# Patient Record
Sex: MALE | Race: WHITE | NOT HISPANIC OR LATINO | Employment: OTHER | URBAN - METROPOLITAN AREA
[De-identification: names, ages, dates, MRNs, and addresses within clinical notes are randomized per-mention and may not be internally consistent; named-entity substitution may affect disease eponyms.]

---

## 2017-11-21 ENCOUNTER — TRANSCRIBE ORDERS (OUTPATIENT)
Dept: ADMINISTRATIVE | Facility: HOSPITAL | Age: 82
End: 2017-11-21

## 2017-11-21 ENCOUNTER — HOSPITAL ENCOUNTER (OUTPATIENT)
Dept: ULTRASOUND IMAGING | Facility: HOSPITAL | Age: 82
Discharge: HOME | End: 2017-11-21
Attending: INTERNAL MEDICINE | Admitting: INTERNAL MEDICINE

## 2017-11-21 DIAGNOSIS — R10.9 FLANK PAIN: ICD-10-CM

## 2017-11-21 DIAGNOSIS — I12.9 CKD STAGE 4 SECONDARY TO HYPERTENSION (HCC): Primary | ICD-10-CM

## 2017-11-21 DIAGNOSIS — N18.4 CKD STAGE 4 SECONDARY TO HYPERTENSION (HCC): ICD-10-CM

## 2017-11-21 DIAGNOSIS — I12.9 CKD STAGE 4 SECONDARY TO HYPERTENSION (HCC): ICD-10-CM

## 2017-11-21 DIAGNOSIS — N18.4 CKD STAGE 4 SECONDARY TO HYPERTENSION (HCC): Primary | ICD-10-CM

## 2017-11-21 PROCEDURE — 76775 US EXAM ABDO BACK WALL LIM: CPT

## 2018-08-06 ENCOUNTER — OFFICE VISIT (OUTPATIENT)
Dept: ORTHOPEDIC SURGERY | Facility: CLINIC | Age: 83
End: 2018-08-06

## 2018-08-06 VITALS — HEIGHT: 66 IN | BODY MASS INDEX: 28.12 KG/M2 | TEMPERATURE: 97.8 F | WEIGHT: 175 LBS

## 2018-08-06 DIAGNOSIS — M25.512 CHRONIC PAIN OF BOTH SHOULDERS: Primary | ICD-10-CM

## 2018-08-06 DIAGNOSIS — G89.29 CHRONIC PAIN OF BOTH SHOULDERS: Primary | ICD-10-CM

## 2018-08-06 DIAGNOSIS — M19.019 ARTHRITIS OF SHOULDER: ICD-10-CM

## 2018-08-06 DIAGNOSIS — M25.511 CHRONIC PAIN OF BOTH SHOULDERS: Primary | ICD-10-CM

## 2018-08-06 PROBLEM — K21.9 GERD (GASTROESOPHAGEAL REFLUX DISEASE): Status: ACTIVE | Noted: 2018-08-06

## 2018-08-06 PROBLEM — K29.70 GASTRITIS: Status: ACTIVE | Noted: 2018-08-06

## 2018-08-06 PROBLEM — E78.5 HYPERLIPIDEMIA: Status: ACTIVE | Noted: 2018-08-06

## 2018-08-06 PROBLEM — M19.90 ARTHRITIS: Status: ACTIVE | Noted: 2018-08-06

## 2018-08-06 PROBLEM — G25.0 BENIGN ESSENTIAL TREMOR: Status: ACTIVE | Noted: 2018-08-06

## 2018-08-06 PROBLEM — I48.21 PERMANENT ATRIAL FIBRILLATION (HCC): Status: ACTIVE | Noted: 2018-08-06

## 2018-08-06 PROBLEM — I49.5 SICK SINUS SYNDROME (HCC): Status: ACTIVE | Noted: 2018-08-06

## 2018-08-06 PROBLEM — R60.9 EDEMA: Status: ACTIVE | Noted: 2018-08-06

## 2018-08-06 PROBLEM — I50.9 CONGESTIVE HEART FAILURE (HCC): Status: ACTIVE | Noted: 2018-08-06

## 2018-08-06 PROBLEM — G91.2 NPH (NORMAL PRESSURE HYDROCEPHALUS) (HCC): Status: ACTIVE | Noted: 2017-03-29

## 2018-08-06 PROBLEM — I35.0 AORTIC VALVE STENOSIS: Status: ACTIVE | Noted: 2018-08-06

## 2018-08-06 PROBLEM — G91.9 HYDROCEPHALUS (HCC): Status: ACTIVE | Noted: 2018-08-06

## 2018-08-06 PROBLEM — I48.0 PAF (PAROXYSMAL ATRIAL FIBRILLATION) (HCC): Status: ACTIVE | Noted: 2018-08-06

## 2018-08-06 PROBLEM — I77.810 AORTIC ROOT DILATATION (HCC): Status: ACTIVE | Noted: 2018-08-06

## 2018-08-06 PROBLEM — I48.92 PAROXYSMAL ATRIAL FLUTTER (HCC): Status: ACTIVE | Noted: 2018-08-06

## 2018-08-06 PROBLEM — I10 HYPERTENSION: Status: ACTIVE | Noted: 2018-08-06

## 2018-08-06 PROBLEM — I25.10 CAD (CORONARY ARTERY DISEASE): Status: ACTIVE | Noted: 2018-08-06

## 2018-08-06 PROBLEM — N18.9 CHRONIC KIDNEY DISEASE: Status: ACTIVE | Noted: 2018-08-06

## 2018-08-06 PROCEDURE — 20610 DRAIN/INJ JOINT/BURSA W/O US: CPT | Performed by: ORTHOPAEDIC SURGERY

## 2018-08-06 PROCEDURE — 99204 OFFICE O/P NEW MOD 45 MIN: CPT | Performed by: ORTHOPAEDIC SURGERY

## 2018-08-06 PROCEDURE — 73030 X-RAY EXAM OF SHOULDER: CPT | Performed by: ORTHOPAEDIC SURGERY

## 2018-08-06 RX ORDER — AMIODARONE HYDROCHLORIDE 200 MG/1
TABLET ORAL
COMMUNITY
Start: 2016-11-29 | End: 2019-06-05 | Stop reason: ALTCHOICE

## 2018-08-06 RX ORDER — FERROUS SULFATE 325(65) MG
325 TABLET ORAL WEEKLY
COMMUNITY

## 2018-08-06 RX ORDER — WARFARIN SODIUM 5 MG/1
TABLET ORAL
COMMUNITY
Start: 2016-09-12 | End: 2018-08-06 | Stop reason: ALTCHOICE

## 2018-08-06 RX ORDER — FUROSEMIDE 80 MG
40 TABLET ORAL EVERY MORNING
COMMUNITY
End: 2019-06-05 | Stop reason: ALTCHOICE

## 2018-08-06 RX ORDER — DOCUSATE SODIUM 100 MG/1
100 CAPSULE, LIQUID FILLED ORAL DAILY
COMMUNITY
End: 2019-06-05 | Stop reason: ALTCHOICE

## 2018-08-06 RX ORDER — CALCITRIOL 0.25 UG/1
CAPSULE, LIQUID FILLED ORAL
COMMUNITY
End: 2019-10-08

## 2018-08-06 RX ORDER — CHOLECALCIFEROL (VITAMIN D3) 50 MCG
2000 TABLET ORAL
COMMUNITY
End: 2019-06-04

## 2018-08-06 RX ORDER — HEPATITIS A VACCINE 1440 [IU]/ML
INJECTION, SUSPENSION INTRAMUSCULAR SEE ADMIN INSTRUCTIONS
Refills: 1 | COMMUNITY
Start: 2018-07-06 | End: 2019-10-08

## 2018-08-06 RX ORDER — ATORVASTATIN CALCIUM 20 MG/1
TABLET, FILM COATED ORAL
COMMUNITY
End: 2019-10-08

## 2018-08-06 RX ADMIN — METHYLPREDNISOLONE ACETATE 80 MG: 80 INJECTION, SUSPENSION INTRA-ARTICULAR; INTRALESIONAL; INTRAMUSCULAR; SOFT TISSUE at 13:12

## 2018-08-06 RX ADMIN — LIDOCAINE HYDROCHLORIDE 2 ML: 10 INJECTION, SOLUTION EPIDURAL; INFILTRATION; INTRACAUDAL; PERINEURAL at 13:12

## 2018-08-06 NOTE — PROGRESS NOTES
Patient: Robert Cheek    YOB: 1921    Medical Record Number: [id number]    Chief Complaints:  Bilateral shoulder pain    History of Present Illness:     89 y.o. male patient who presents with a long history of progressively worsening pain and dysfunction affecting both shoulders.  His symptoms have gotten progressively worse over the past month.  He cannot recall any specific inciting event or factor.  He reports moderate to severe constant stabbing pain.  Symptoms are worse when he raises his arms to or above shoulder level.  He has not noticed any mechanical symptoms or swelling.  Tylenol has helped somewhat.  Both arms bother him.  The right is somewhat worse.  He is right-hand-dominant.    Allergies:   Allergies   Allergen Reactions   • Adhesive Tape Unknown (See Comments)     Redness, raw   • Dabigatran Etexilate Mesylate Unknown (See Comments) and Nausea And Vomiting   • Ibuprofen Unknown (See Comments)   • Indomethacin Nausea And Vomiting and Unknown (See Comments)   • Simvastatin Nausea And Vomiting and Unknown (See Comments)       Home Medications:    Current Outpatient Prescriptions:   •  amiodarone (PACERONE) 200 MG tablet, , Disp: , Rfl:   •  atorvastatin (LIPITOR) 20 MG tablet, atorvastatin 20 mg tablet  Take 1 tablet every day by oral route at bedtime for 90 days., Disp: , Rfl:   •  calcitriol (ROCALTROL) 0.25 MCG capsule, calcitriol 0.25 mcg capsule  Take 1 capsule every day by oral route in the evening for 90 days., Disp: , Rfl:   •  Cholecalciferol (VITAMIN D) 2000 units tablet, Take 2,000 Units by mouth., Disp: , Rfl:   •  docusate sodium (COLACE) 100 MG capsule, Take 100 mg by mouth Daily. Take 1 capsule by mouth every day, Disp: , Rfl:   •  ferrous sulfate 325 (65 FE) MG tablet, Take 325 mg by mouth 1 (One) Time Per Week. Take 1 tablet by mouth every Friday evening, Disp: , Rfl:   •  furosemide (LASIX) 80 MG tablet, Take 40 mg by mouth Every Morning., Disp: , Rfl:   •  metoprolol  tartrate (LOPRESSOR) 25 MG tablet, Take 25 mg by mouth 2 (Two) Times a Day. Take one half tablet by mouth twice daily, Disp: , Rfl:   •  Probiotic Product (PROBIOTIC-10 PO), Probiotic  Use as directed., Disp: , Rfl:   •  Cholecalciferol (VITAMIN D3) 1000 units capsule, Vitamin D3 1,000 unit capsule  Take 2000units as directed., Disp: , Rfl:   •  HAVRIX 1440 EL U/ML vaccine, See Admin Instructions., Disp: , Rfl: 1  •  Misc. Devices (COMMODE BEDSIDE) misc, 1 each., Disp: , Rfl:     History reviewed. No pertinent past medical history.    Past Surgical History:   Procedure Laterality Date   • CSF SHUNT  05/2017   • KNEE SURGERY Left 2012   • PACEMAKER REPLACEMENT  2006       Social History     Occupational History   • Not on file.     Social History Main Topics   • Smoking status: Never Smoker   • Smokeless tobacco: Not on file   • Alcohol use Not on file   • Drug use: Unknown   • Sexual activity: Not on file      Social History     Social History Narrative   • No narrative on file       History reviewed. No pertinent family history.    Review of Systems:      Constitutional: Denies fever, shaking or chills   Eyes: Denies change in visual acuity   HEENT: Denies nasal congestion or sore throat   Respiratory: Denies cough or shortness of breath   Cardiovascular: Denies chest pain or edema  Endocrine: Denies tremors, palpitations, intolerance of heat or cold, polyuria, polydipsia.  GI: Denies abdominal pain, nausea, vomiting, bloody stools or diarrhea  : Denies frequency, urgency, incontinence, retention, or nocturia.  Musculoskeletal: Denies numbness, tingling or loss of motor function   Integument: Denies rash, lesion or ulceration   Neurologic: Denies headache or focal weakness, deficits  Heme: Denies spontaneous or excessive bleeding, epistaxis, hematuria, melena, fatigue, enlarged or tender lymph nodes.      All other pertinent positives and negatives as noted above in HPI.    Physical Exam: 89 y.o. male  Vitals:     "08/06/18 1246   Temp: 97.8 °F (36.6 °C)   TempSrc: Temporal Artery    Weight: 79.4 kg (175 lb)   Height: 167.6 cm (66\")     General:  Patient is awake and alert.  Appears in no acute distress or discomfort.    Psych:  Affect and demeanor are appropriate.    Eyes:  Conjunctiva and sclera appear grossly normal.  Eyes track well and EOM seem to be intact.    Ears:  No gross abnormalities.  Hearing adequate for the exam.    Cardiovascular:  Regular rate and rhythm.    Lungs:  Good chest expansion.  Breathing unlabored.    Spine:  Neck appears grossly normal.  No palpable masses or adenopathy.  Good motion.  Spurling's maneuver is negative for any shoulder or arm symptoms.    Extremities:  Skin is benign.  No obvious gross abnormalities about right shoulder.  No palpable masses or adenopathy.  Moderate tenderness noted over anterior glenohumeral joint and rotator interval.  Motion is to 105° of forward elevation, 25° external rotation, lacks 10° of horizontal abduction, back pocket internal rotation.  There is crepitus with range of motion.  No instability.  Rotator cuff strength seems to be well preserved but the exam is limited due to patient discomfort with resisted active motion.  Good motor and sensory function in lower arm and hand.  Palpable pulses.    Left shoulder exam is similar.  Motion is about the same to that on his right.  He does have crepitus with range of motion.  Seems to have good strength in his rotator cuff and deltoid but resisted testing is uncomfortable.         Radiology:  Bilateral AP and scapular Y views of the shoulders are ordered by myself and reviewed to evaluate the patient's complaint.  No comparison films are immediately available.  The x-rays show moderate to severe glenohumeral osteoarthritis with joint space narrowing, osteophyte formation, and subchondral sclerosis.  The acromiohumeral interval measures normal.    Assessment/Plan:  Bilateral shoulder osteoarthritis    We discussed " treatment options in detail including conservative treatment versus surgical options.  Regarding conservative treatment, we discussed appropriate activity modifications, anti-inflammatories, injections, and physical therapy.  We also discussed the option of an arthroplasty and all that would entail.  I have recommended that we start with a conservative approach and the patient agrees.    The patient has acknowledged understanding of the information and elected for injections of both shoulders.  The risks, benefits and alternatives were discussed.  He consented.  Going forward, he will follow-up as needed.    Montez Holden MD    08/06/2018    CC to Pallares, Clara Ann, MD    Large Joint Arthrocentesis  Date/Time: 8/6/2018 1:12 PM  Consent given by: patient  Site marked: site marked  Timeout: Immediately prior to procedure a time out was called to verify the correct patient, procedure, equipment, support staff and site/side marked as required   Supporting Documentation  Indications: pain   Procedure Details  Location: shoulder - R glenohumeral  Preparation: Patient was prepped and draped in the usual sterile fashion  Needle gauge: 21 G.  Approach: posterior  Medications administered: 80 mg methylPREDNISolone acetate 80 MG/ML; 2 mL lidocaine PF 1% 1 %  Patient tolerance: patient tolerated the procedure well with no immediate complications    Large Joint Arthrocentesis  Date/Time: 8/6/2018 1:12 PM  Consent given by: patient  Site marked: site marked  Timeout: Immediately prior to procedure a time out was called to verify the correct patient, procedure, equipment, support staff and site/side marked as required   Supporting Documentation  Indications: pain   Procedure Details  Location: shoulder - L glenohumeral  Preparation: Patient was prepped and draped in the usual sterile fashion  Needle gauge: 21 g.  Approach: posterior  Medications administered: 80 mg methylPREDNISolone acetate 80 MG/ML; 2 mL lidocaine PF 1% 1  %  Patient tolerance: patient tolerated the procedure well with no immediate complications

## 2018-08-07 RX ORDER — LIDOCAINE HYDROCHLORIDE 10 MG/ML
2 INJECTION, SOLUTION EPIDURAL; INFILTRATION; INTRACAUDAL; PERINEURAL
Status: COMPLETED | OUTPATIENT
Start: 2018-08-06 | End: 2018-08-06

## 2018-08-07 RX ORDER — METHYLPREDNISOLONE ACETATE 80 MG/ML
80 INJECTION, SUSPENSION INTRA-ARTICULAR; INTRALESIONAL; INTRAMUSCULAR; SOFT TISSUE
Status: COMPLETED | OUTPATIENT
Start: 2018-08-06 | End: 2018-08-06

## 2018-12-11 ENCOUNTER — TRANSCRIBE ORDERS (OUTPATIENT)
Dept: ADMINISTRATIVE | Facility: HOSPITAL | Age: 83
End: 2018-12-11

## 2018-12-11 DIAGNOSIS — M21.371 RIGHT FOOT DROP: Primary | ICD-10-CM

## 2019-01-01 ENCOUNTER — HOSPITAL ENCOUNTER (OUTPATIENT)
Dept: CARDIOLOGY | Facility: HOSPITAL | Age: 84
Discharge: HOME | End: 2019-12-20
Admitting: INTERNAL MEDICINE

## 2019-01-01 VITALS
WEIGHT: 181 LBS | SYSTOLIC BLOOD PRESSURE: 122 MMHG | BODY MASS INDEX: 25.91 KG/M2 | HEIGHT: 70 IN | DIASTOLIC BLOOD PRESSURE: 64 MMHG

## 2019-01-01 DIAGNOSIS — I35.0 NONRHEUMATIC AORTIC VALVE STENOSIS: ICD-10-CM

## 2019-01-01 LAB
AORTIC DIMENSIONLESS INDEX: 0.4 (DI)
BH CV ECHO MEAS - ACS: 1 CM
BH CV ECHO MEAS - AI DEC SLOPE: 263 CM/SEC^2
BH CV ECHO MEAS - AI MAX PG: 79.2 MMHG
BH CV ECHO MEAS - AI MAX VEL: 445 CM/SEC
BH CV ECHO MEAS - AI P1/2T: 495.6 MSEC
BH CV ECHO MEAS - AO MAX PG: 27 MMHG
BH CV ECHO MEAS - AO MEAN PG (FULL): 13 MMHG
BH CV ECHO MEAS - AO MEAN PG: 17 MMHG
BH CV ECHO MEAS - AO ROOT AREA (BSA CORRECTED): 1.8
BH CV ECHO MEAS - AO ROOT AREA: 10.2 CM^2
BH CV ECHO MEAS - AO ROOT DIAM: 3.6 CM
BH CV ECHO MEAS - AO V2 MAX: 259 CM/SEC
BH CV ECHO MEAS - AO V2 MEAN: 197 CM/SEC
BH CV ECHO MEAS - AO V2 VTI: 76.7 CM
BH CV ECHO MEAS - ASC AORTA: 3.6 CM
BH CV ECHO MEAS - AVA PLANIMETRY TRACED: 2 CM2
BH CV ECHO MEAS - AVA(I,A): 1.4 CM^2
BH CV ECHO MEAS - AVA(I,D): 1.4 CM^2
BH CV ECHO MEAS - BSA(HAYCOCK): 2 M^2
BH CV ECHO MEAS - BSA: 2 M^2
BH CV ECHO MEAS - BZI_BMI: 26 KILOGRAMS/M^2
BH CV ECHO MEAS - BZI_METRIC_HEIGHT: 177.8 CM
BH CV ECHO MEAS - BZI_METRIC_WEIGHT: 82.1 KG
BH CV ECHO MEAS - EDV(CUBED): 42.9 ML
BH CV ECHO MEAS - EDV(MOD-SP2): 54 ML
BH CV ECHO MEAS - EDV(MOD-SP4): 58 ML
BH CV ECHO MEAS - EDV(TEICH): 50.9 ML
BH CV ECHO MEAS - EF(CUBED): 67.8 %
BH CV ECHO MEAS - EF(MOD-BP): 64 %
BH CV ECHO MEAS - EF(MOD-SP2): 64.8 %
BH CV ECHO MEAS - EF(MOD-SP4): 63.8 %
BH CV ECHO MEAS - EF(TEICH): 60.4 %
BH CV ECHO MEAS - ESV(CUBED): 13.8 ML
BH CV ECHO MEAS - ESV(MOD-SP2): 19 ML
BH CV ECHO MEAS - ESV(MOD-SP4): 21 ML
BH CV ECHO MEAS - ESV(TEICH): 20.2 ML
BH CV ECHO MEAS - FS: 31.4 %
BH CV ECHO MEAS - IVS/LVPW: 1.3
BH CV ECHO MEAS - IVSD: 1.7 CM
BH CV ECHO MEAS - LAT PEAK E' VEL: 6 CM/SEC
BH CV ECHO MEAS - LV DIASTOLIC VOL/BSA (35-75): 29 ML/M^2
BH CV ECHO MEAS - LV MASS(C)D: 193.4 GRAMS
BH CV ECHO MEAS - LV MASS(C)DI: 96.7 GRAMS/M^2
BH CV ECHO MEAS - LV MAX PG: 6 MMHG
BH CV ECHO MEAS - LV MEAN PG: 4 MMHG
BH CV ECHO MEAS - LV SYSTOLIC VOL/BSA (12-30): 10.5 ML/M^2
BH CV ECHO MEAS - LV V1 MAX: 120 CM/SEC
BH CV ECHO MEAS - LV V1 MEAN: 93.1 CM/SEC
BH CV ECHO MEAS - LV V1 VTI: 31 CM
BH CV ECHO MEAS - LVIDD: 3.5 CM
BH CV ECHO MEAS - LVIDS: 2.4 CM
BH CV ECHO MEAS - LVLD AP2: 6.1 CM
BH CV ECHO MEAS - LVLD AP4: 6 CM
BH CV ECHO MEAS - LVLS AP2: 5.1 CM
BH CV ECHO MEAS - LVLS AP4: 5 CM
BH CV ECHO MEAS - LVOT AREA (M): 3.5 CM^2
BH CV ECHO MEAS - LVOT AREA: 3.5 CM^2
BH CV ECHO MEAS - LVOT DIAM: 2.1 CM
BH CV ECHO MEAS - LVPWD: 1.3 CM
BH CV ECHO MEAS - MED PEAK E' VEL: 5 CM/SEC
BH CV ECHO MEAS - MV A DUR: 0.14 SEC
BH CV ECHO MEAS - MV A MAX VEL: 68.1 CM/SEC
BH CV ECHO MEAS - MV DEC SLOPE: 577 CM/SEC^2
BH CV ECHO MEAS - MV DEC TIME: 200 SEC
BH CV ECHO MEAS - MV E MAX VEL: 103 CM/SEC
BH CV ECHO MEAS - MV E/A: 1.5
BH CV ECHO MEAS - MV MEAN PG: 3 MMHG
BH CV ECHO MEAS - MV P1/2T MAX VEL: 130 CM/SEC
BH CV ECHO MEAS - MV P1/2T: 66 MSEC
BH CV ECHO MEAS - MV V2 MEAN: 83 CM/SEC
BH CV ECHO MEAS - MV V2 VTI: 32.6 CM
BH CV ECHO MEAS - MVA P1/2T LCG: 1.7 CM^2
BH CV ECHO MEAS - MVA(P1/2T): 3.3 CM^2
BH CV ECHO MEAS - MVA(VTI): 3.3 CM^2
BH CV ECHO MEAS - PA ACC SLOPE: 1033 CM/SEC^2
BH CV ECHO MEAS - PA ACC TIME: 0.09 SEC
BH CV ECHO MEAS - PA MAX PG: 3.1 MMHG
BH CV ECHO MEAS - PA PR(ACCEL): 39.4 MMHG
BH CV ECHO MEAS - PA V2 MAX: 88.2 CM/SEC
BH CV ECHO MEAS - QP/QS: 0.78
BH CV ECHO MEAS - RAP SYSTOLE: 8 MMHG
BH CV ECHO MEAS - RV MEAN PG: 1 MMHG
BH CV ECHO MEAS - RV V1 MEAN: 48.7 CM/SEC
BH CV ECHO MEAS - RV V1 VTI: 12.7 CM
BH CV ECHO MEAS - RVOT AREA: 6.6 CM^2
BH CV ECHO MEAS - RVOT DIAM: 2.9 CM
BH CV ECHO MEAS - RVSP: 44 MMHG
BH CV ECHO MEAS - SI(AO): 390.2 ML/M^2
BH CV ECHO MEAS - SI(CUBED): 14.5 ML/M^2
BH CV ECHO MEAS - SI(LVOT): 53.7 ML/M^2
BH CV ECHO MEAS - SI(MOD-SP2): 17.5 ML/M^2
BH CV ECHO MEAS - SI(MOD-SP4): 18.5 ML/M^2
BH CV ECHO MEAS - SI(TEICH): 15.3 ML/M^2
BH CV ECHO MEAS - SV(AO): 780.7 ML
BH CV ECHO MEAS - SV(CUBED): 29.1 ML
BH CV ECHO MEAS - SV(LVOT): 107.4 ML
BH CV ECHO MEAS - SV(MOD-SP2): 35 ML
BH CV ECHO MEAS - SV(MOD-SP4): 37 ML
BH CV ECHO MEAS - SV(RVOT): 83.9 ML
BH CV ECHO MEAS - SV(TEICH): 30.7 ML
BH CV ECHO MEAS - TAPSE (>1.6): 3 CM2
BH CV ECHO MEAS - TR MAX VEL: 301 CM/SEC
BH CV ECHO MEASUREMENTS AVERAGE E/E' RATIO: 18.73
BH CV VAS BP LEFT ARM: NORMAL MMHG
BH CV XLRA - RV BASE: 3.9 CM
BH CV XLRA - TDI S': 15 CM/SEC
LEFT ATRIUM VOLUME INDEX: 52 ML/M2

## 2019-01-01 PROCEDURE — 93306 TTE W/DOPPLER COMPLETE: CPT | Performed by: INTERNAL MEDICINE

## 2019-01-01 PROCEDURE — 93306 TTE W/DOPPLER COMPLETE: CPT

## 2019-01-08 ENCOUNTER — HOSPITAL ENCOUNTER (OUTPATIENT)
Dept: INFUSION THERAPY | Facility: HOSPITAL | Age: 84
Discharge: HOME | End: 2019-01-08
Attending: PSYCHIATRY & NEUROLOGY | Admitting: PSYCHIATRY & NEUROLOGY

## 2019-01-08 DIAGNOSIS — M21.371 RIGHT FOOT DROP: ICD-10-CM

## 2019-01-08 PROCEDURE — 95886 MUSC TEST DONE W/N TEST COMP: CPT | Performed by: PSYCHIATRY & NEUROLOGY

## 2019-01-08 PROCEDURE — 95886 MUSC TEST DONE W/N TEST COMP: CPT

## 2019-01-08 PROCEDURE — 95907 NVR CNDJ TST 1-2 STUDIES: CPT

## 2019-01-08 PROCEDURE — 95907 NVR CNDJ TST 1-2 STUDIES: CPT | Performed by: PSYCHIATRY & NEUROLOGY

## 2019-01-08 NOTE — PROCEDURES
EMG REPORT    Indication: Right foot drop    Findings: Right peroneal motor study showed normal distal latency small amplitude response and very slow velocity of 22 m/s.  Right tibial motor study showed normal distal latency velocity and amplitude.    .Concentric needle EMG of the right vastus lateralis, vastus medialis,  peroneus, gastrocnemius muscles showed no abnormality.  In the right anterior tibialis muscle there was activity restiform fibrillation potentials and positive sharp waves.  Motor unit potentials were decreased but present.    Impression: Studies show evidence of an incomplete, but severe, right peroneal neuropathy which most likely localizes at the popliteal fossa region.  Needle EMG failed to show evidence of superimposed lumbosacral radiculopathy.    Thank you for sending this patient for further evaluation.  Teto Simmons M.D.

## 2019-06-05 ENCOUNTER — OFFICE VISIT (OUTPATIENT)
Dept: CARDIOLOGY | Facility: CLINIC | Age: 84
End: 2019-06-05

## 2019-06-05 VITALS
SYSTOLIC BLOOD PRESSURE: 112 MMHG | HEIGHT: 70 IN | HEART RATE: 76 BPM | WEIGHT: 176 LBS | BODY MASS INDEX: 25.2 KG/M2 | DIASTOLIC BLOOD PRESSURE: 72 MMHG

## 2019-06-05 DIAGNOSIS — I48.21 PERMANENT ATRIAL FIBRILLATION (HCC): ICD-10-CM

## 2019-06-05 DIAGNOSIS — Z95.0 PRESENCE OF CARDIAC PACEMAKER: ICD-10-CM

## 2019-06-05 DIAGNOSIS — G91.2 NPH (NORMAL PRESSURE HYDROCEPHALUS) (HCC): ICD-10-CM

## 2019-06-05 DIAGNOSIS — R60.0 LOCALIZED EDEMA: Primary | ICD-10-CM

## 2019-06-05 DIAGNOSIS — I10 ESSENTIAL HYPERTENSION: ICD-10-CM

## 2019-06-05 DIAGNOSIS — I95.1 AUTONOMIC ORTHOSTATIC HYPOTENSION: ICD-10-CM

## 2019-06-05 DIAGNOSIS — I35.0 NONRHEUMATIC AORTIC VALVE STENOSIS: ICD-10-CM

## 2019-06-05 PROCEDURE — 99215 OFFICE O/P EST HI 40 MIN: CPT | Performed by: INTERNAL MEDICINE

## 2019-06-05 RX ORDER — MIDODRINE HYDROCHLORIDE 2.5 MG/1
2.5 TABLET ORAL DAILY
Refills: 4 | COMMUNITY
Start: 2019-05-29

## 2019-06-05 RX ORDER — FUROSEMIDE 40 MG/1
40 TABLET ORAL 2 TIMES DAILY
Status: ON HOLD | COMMUNITY
End: 2019-10-17

## 2019-06-05 NOTE — PROGRESS NOTES
[facility]        Subjective:     Encounter Date:06/05/2019      Patient ID: Robert Cheek is a 89 y.o. male.    Chief Complaint:  Chief Complaint   Patient presents with   • Atrial Fibrillation   • Congestive Heart Failure       HPI:  I had the pleasure of seeing Mr. Cheek in the office today.  He is a pleasant 89-year-old gentleman who has a history of atrial fibrillation.  His atrial fibrillation is now permanent.  He also has diastolic heart failure, hyperlipidemia, moderate aortic valve stenosis, chronic kidney disease, sick sinus syndrome and a permanent pacemaker.    Robert is in the office today due to a new problem of edema in his left upper extremity.  He states this began approximately 2 weeks ago.  His diuretic was increased to 80 mg daily.  He has not noticed significant improvement.  His daughter accompanies him and states that he has weeping fluid from his left arm periodically.  He does go to physical therapy for mobility in his upper extremities.  He has had no recent trauma.  There is mild discomfort in the left  upper extremity.  He also has chronic lower extremity edema and wears compression stockings.  He is alert and oriented.  He does not complain of shortness of air but he is fairly sedentary in his wheelchair.  He has orthostatic hypotension as well as normal pressure hydrocephalus.  He still has some episodes where he is unsteady on his feet.  He is not complaining of chest discomfort.  He has had no fever or chills.    The following portions of the patient's history were reviewed and updated as appropriate: allergies, current medications, past family history, past medical history, past social history, past surgical history and problem list.    Problem List:  Patient Active Problem List   Diagnosis   • Acute pain of right shoulder   • Aortic root dilatation (CMS/HCC)   • Aortic valve stenosis   • Arthritis   • Benign essential tremor   • CAD (coronary artery disease)   •  Chronic kidney disease   • Congestive heart failure (CMS/[facility])   • Gastritis   • GERD (gastroesophageal reflux disease)   • Tachycardia   • Sick sinus syndrome (CMS/[facility])   • Permanent atrial fibrillation (CMS/[facility])   • Paroxysmal atrial flutter (CMS/[facility])   • PAF (paroxysmal atrial fibrillation) (CMS/[facility])   • NPH (normal pressure hydrocephalus)   • Hypertension   • Hyperlipidemia   • Hypercholesteremia   • Hydrocephalus   • Edema   • Presence of cardiac pacemaker   • Autonomic orthostatic hypotension       Past Medical History:  Past Medical History:   Diagnosis Date   • Aortic root dilatation (CMS/[facility])    • Aortic valve stenosis    • Atrial fibrillation (CMS/[facility])    • CHF (congestive heart failure) (CMS/[facility])    • Chronic kidney disease    • GERD (gastroesophageal reflux disease)    • Hydrocephalus    • Hyperlipidemia    • Hypertension    • SSS (sick sinus syndrome) (CMS/[facility])        Past Surgical History:  Past Surgical History:   Procedure Laterality Date   • CSF SHUNT  05/2017   • HERNIA REPAIR     • INSERT / REPLACE / REMOVE PACEMAKER     • KNEE SURGERY Left 2012   • PACEMAKER REPLACEMENT  2006       Social History:  Social History     Socioeconomic History   • Marital status:      Spouse name: Not on file   • Number of children: Not on file   • Years of education: Not on file   • Highest education level: Not on file   Tobacco Use   • Smoking status: Never Smoker       Allergies:  Allergies   Allergen Reactions   • Adhesive Tape Unknown (See Comments)     Redness, raw   • Dabigatran Etexilate Mesylate Unknown (See Comments) and Nausea And Vomiting   • Ibuprofen Unknown (See Comments)   • Indomethacin Nausea And Vomiting and Unknown (See Comments)   • Simvastatin Nausea And Vomiting and Unknown (See Comments)         ROS:  Review of Systems   Constitution: Positive for malaise/fatigue. Negative for chills, decreased appetite, fever, weight gain and weight loss.   HENT: Negative for congestion, hoarse voice,  "nosebleeds and sore throat.    Eyes: Negative for blurred vision, double vision and visual disturbance.   Cardiovascular: Positive for leg swelling. Negative for chest pain, claudication, dyspnea on exertion, irregular heartbeat, near-syncope, orthopnea, palpitations, paroxysmal nocturnal dyspnea and syncope.   Respiratory: Negative for cough, hemoptysis, shortness of breath, sleep disturbances due to breathing, snoring, sputum production and wheezing.    Endocrine: Negative for cold intolerance, heat intolerance, polydipsia and polyuria.   Hematologic/Lymphatic: Negative for adenopathy and bleeding problem. Bruises/bleeds easily.   Skin: Negative for flushing, itching, nail changes and rash.   Musculoskeletal: Positive for arthritis and muscle weakness. Negative for back pain, joint pain, muscle cramps, myalgias and neck pain.   Gastrointestinal: Negative for bloating, abdominal pain, anorexia, change in bowel habit, constipation, diarrhea, heartburn, hematemesis, hematochezia, jaundice, melena, nausea and vomiting.   Genitourinary: Negative for dysuria, hematuria and nocturia.   Neurological: Positive for disturbances in coordination and light-headedness. Negative for brief paralysis, excessive daytime sleepiness, dizziness, headaches, loss of balance, numbness, paresthesias, seizures and vertigo.        Normal pressure hydrocephalus   Psychiatric/Behavioral: Negative for altered mental status and depression. The patient is not nervous/anxious.    Allergic/Immunologic: Negative for environmental allergies and hives.          Objective:         /72   Pulse 76   Ht 177.8 cm (70\")   Wt 79.8 kg (176 lb)   BMI 25.25 kg/m²     Physical Exam   Constitutional: He is oriented to person, place, and time. No distress.   Slightly disheveled appearance   HENT:   Head:       Mouth/Throat: Oropharynx is clear and moist.   There is a shunt in place in the right parietotemporal area   Eyes: Conjunctivae and EOM are " normal. Pupils are equal, round, and reactive to light. No scleral icterus.   Neck: Normal range of motion. Neck supple. No thyromegaly present.   Cardiovascular: Normal rate, S1 normal, S2 normal and intact distal pulses.  No extrasystoles are present. PMI is not displaced. Exam reveals no gallop, no S3, no S4, no friction rub and no decreased pulses.   Murmur ( There is a 2/6 systolic murmur heard at the upper right sternal border) heard.  Pulses:       Carotid pulses are 1+ on the right side, and 1+ on the left side.       Radial pulses are 2+ on the right side, and 2+ on the left side.        Dorsalis pedis pulses are 1+ on the right side, and 1+ on the left side.        Posterior tibial pulses are 1+ on the right side, and 1+ on the left side.   Pulmonary/Chest: Effort normal and breath sounds normal. No respiratory distress. He has no wheezes. He has no rales.   Healed pacemaker scar   Abdominal: Soft. Bowel sounds are normal. He exhibits no distension and no mass. There is no tenderness. There is no rebound and no guarding.   Mild abdominal fullness   Musculoskeletal: Normal range of motion. He exhibits edema ( There is 2-3+ pitting edema of the lower extremities bilaterally).   Lymphadenopathy:     He has no cervical adenopathy.   Neurological: He is alert and oriented to person, place, and time. Coordination normal.   Skin: Skin is warm. Ecchymosis (Bilateral upper extremity ecchymosis. ) noted. No rash noted. He is not diaphoretic. No pallor.   There is marked ecchymosis of both upper extremities.  The left upper extremity and hand are edematous and there is mild weeping edema.   Psychiatric: He has a normal mood and affect. His behavior is normal.   Nursing note and vitals reviewed.      In-Office Procedure(s):  Procedures    ASCVD RIsk Score::  The ASCVD Risk score (Boris ADITHYA Jr., et al., 2013) failed to calculate for the following reasons:    The 2013 ASCVD risk score is only valid for ages 40 to  79    Recent Radiology:  Imaging Results (most recent)     None          Lab Review:              Invalid input(s): ALKPO4                        Invalid input(s): LDLCALC                Assessment:         Problems Addressed this Visit        Cardiovascular and Mediastinum    Aortic valve stenosis    Relevant Medications    midodrine (PROAMATINE) 2.5 MG tablet    Permanent atrial fibrillation (CMS/HCC)    Relevant Medications    midodrine (PROAMATINE) 2.5 MG tablet    Hypertension    Relevant Medications    midodrine (PROAMATINE) 2.5 MG tablet    furosemide (LASIX) 40 MG tablet    Presence of cardiac pacemaker    Autonomic orthostatic hypotension       Nervous and Auditory    NPH (normal pressure hydrocephalus)       Other    Edema - Primary    Relevant Orders    Duplex Venous Upper Extremity - Left CAR             Plan:     I spoke with Dr. Haney, Mr. Cheek's nephrologist.  We both agree that his diuretic should not be increased at this time.  We also agreed that he should wrap his arm in an Ace bandage continue with mobility.  I have ordered an ultrasound of his upper extremity to make sure there is no evidence of a DVT.  Mr. Cheek has had a general decline in his health over the past year.  He has become more dependent and less mobile.  He does have aortic valve stenosis which upon his echo in June 2018 revealed moderate aortic stenosis.  He also has moderate aortic insufficiency, mild mitral insufficiency and severe tricuspid insufficiency.   We will continue to be managed conservatively as has been requested by his daughter in the past.  I spent approximately 30 to 45 minutes with the patient and his daughter, reviewing his records and discussion with Dr. Haney.             Marquita Luna MD  06/05/19  .

## 2019-06-06 ENCOUNTER — HOSPITAL ENCOUNTER (OUTPATIENT)
Dept: CARDIOLOGY | Facility: HOSPITAL | Age: 84
Discharge: HOME | End: 2019-06-06
Admitting: INTERNAL MEDICINE

## 2019-06-06 DIAGNOSIS — R60.0 LOCALIZED EDEMA: ICD-10-CM

## 2019-06-06 LAB
BH CV UPPER VENOUS LEFT AXILLARY AUGMENT: NORMAL
BH CV UPPER VENOUS LEFT AXILLARY COMPETENT: NORMAL
BH CV UPPER VENOUS LEFT AXILLARY COMPRESS: NORMAL
BH CV UPPER VENOUS LEFT AXILLARY PHASIC: NORMAL
BH CV UPPER VENOUS LEFT AXILLARY SPONT: NORMAL
BH CV UPPER VENOUS LEFT BASILIC FOREARM COMPRESS: NORMAL
BH CV UPPER VENOUS LEFT BASILIC UPPER COMPRESS: NORMAL
BH CV UPPER VENOUS LEFT BRACHIAL COMPRESS: NORMAL
BH CV UPPER VENOUS LEFT CEPHALIC FOREARM COMPRESS: NORMAL
BH CV UPPER VENOUS LEFT CEPHALIC UPPER COMPRESS: NORMAL
BH CV UPPER VENOUS LEFT INTERNAL JUGULAR AUGMENT: NORMAL
BH CV UPPER VENOUS LEFT INTERNAL JUGULAR COMPETENT: NORMAL
BH CV UPPER VENOUS LEFT INTERNAL JUGULAR COMPRESS: NORMAL
BH CV UPPER VENOUS LEFT INTERNAL JUGULAR PHASIC: NORMAL
BH CV UPPER VENOUS LEFT INTERNAL JUGULAR SPONT: NORMAL
BH CV UPPER VENOUS LEFT RADIAL COMPRESS: NORMAL
BH CV UPPER VENOUS LEFT SUBCLAVIAN AUGMENT: NORMAL
BH CV UPPER VENOUS LEFT SUBCLAVIAN COMPETENT: NORMAL
BH CV UPPER VENOUS LEFT SUBCLAVIAN COMPRESS: NORMAL
BH CV UPPER VENOUS LEFT SUBCLAVIAN PHASIC: NORMAL
BH CV UPPER VENOUS LEFT SUBCLAVIAN SPONT: NORMAL
BH CV UPPER VENOUS LEFT ULNAR COMPRESS: NORMAL
BH CV UPPER VENOUS RIGHT INTERNAL JUGULAR AUGMENT: NORMAL
BH CV UPPER VENOUS RIGHT INTERNAL JUGULAR COMPETENT: NORMAL
BH CV UPPER VENOUS RIGHT INTERNAL JUGULAR COMPRESS: NORMAL
BH CV UPPER VENOUS RIGHT INTERNAL JUGULAR PHASIC: NORMAL
BH CV UPPER VENOUS RIGHT INTERNAL JUGULAR SPONT: NORMAL
BH CV UPPER VENOUS RIGHT SUBCLAVIAN AUGMENT: NORMAL
BH CV UPPER VENOUS RIGHT SUBCLAVIAN COMPETENT: NORMAL
BH CV UPPER VENOUS RIGHT SUBCLAVIAN COMPRESS: NORMAL
BH CV UPPER VENOUS RIGHT SUBCLAVIAN PHASIC: NORMAL
BH CV UPPER VENOUS RIGHT SUBCLAVIAN SPONT: NORMAL

## 2019-06-06 PROCEDURE — 93971 EXTREMITY STUDY: CPT

## 2019-06-07 ENCOUNTER — TELEPHONE (OUTPATIENT)
Dept: CARDIOLOGY | Facility: CLINIC | Age: 84
End: 2019-06-07

## 2019-06-07 NOTE — TELEPHONE ENCOUNTER
PT daughter called. PT went to [facility] office,ortho. DX with Lymphodemia and he will see a lymphonologist next week. His arm is wrapped with compressions.

## 2019-06-10 ENCOUNTER — TRANSCRIBE ORDERS (OUTPATIENT)
Dept: PHYSICAL THERAPY | Facility: HOSPITAL | Age: 84
End: 2019-06-10

## 2019-06-10 DIAGNOSIS — R60.0 EDEMA OF UPPER EXTREMITY: Primary | ICD-10-CM

## 2019-06-21 ENCOUNTER — APPOINTMENT (OUTPATIENT)
Dept: OCCUPATIONAL THERAPY | Facility: HOSPITAL | Age: 84
End: 2019-06-21

## 2019-07-16 ENCOUNTER — HOSPITAL ENCOUNTER (OUTPATIENT)
Dept: PHYSICAL THERAPY | Facility: HOSPITAL | Age: 84
Setting detail: THERAPIES SERIES
Discharge: HOME | End: 2019-07-16

## 2019-07-16 DIAGNOSIS — I89.0 LYMPHEDEMA: Primary | ICD-10-CM

## 2019-07-16 PROCEDURE — 97162 PT EVAL MOD COMPLEX 30 MIN: CPT

## 2019-07-16 NOTE — THERAPY EVALUATION
Physical Therapy Lymphedema Initial Evaluation  [facility]     Patient Name: Robert Cheek  : 1921  MRN: [id number]  Today's Date: 2019      Visit Date: 2019    Visit Dx:    ICD-10-CM ICD-9-CM   1. Lymphedema I89.0 457.1       Patient Active Problem List   Diagnosis   • Acute pain of right shoulder   • Aortic root dilatation (CMS/HCC)   • Aortic valve stenosis   • Arthritis   • Benign essential tremor   • CAD (coronary artery disease)   • Chronic kidney disease   • Congestive heart failure (CMS/HCC)   • Gastritis   • GERD (gastroesophageal reflux disease)   • Tachycardia   • Sick sinus syndrome (CMS/HCC)   • Permanent atrial fibrillation (CMS/HCC)   • Paroxysmal atrial flutter (CMS/HCC)   • PAF (paroxysmal atrial fibrillation) (CMS/HCC)   • NPH (normal pressure hydrocephalus)   • Hypertension   • Hyperlipidemia   • Hypercholesteremia   • Hydrocephalus   • Edema   • Presence of cardiac pacemaker   • Autonomic orthostatic hypotension        Past Medical History:   Diagnosis Date   • Aortic root dilatation (CMS/HCC)    • Aortic valve stenosis    • Atrial fibrillation (CMS/HCC)    • CHF (congestive heart failure) (CMS/HCC)    • Chronic kidney disease    • GERD (gastroesophageal reflux disease)    • Hydrocephalus    • Hyperlipidemia    • Hypertension    • SSS (sick sinus syndrome) (CMS/HCC)         Past Surgical History:   Procedure Laterality Date   • CSF SHUNT  2017   • HERNIA REPAIR     • INSERT / REPLACE / REMOVE PACEMAKER     • KNEE SURGERY Left    • PACEMAKER REPLACEMENT         Visit Dx:    ICD-10-CM ICD-9-CM   1. Lymphedema I89.0 457.1       Patient History     Row Name 19 0900             History    Chief Complaint  Swelling left arm  -KD      Date Current Problem(s) Began  -- 6 months ago  -KD      Brief Description of Current Complaint  States he started having swelling in his left arm about 6 months ago for no apparent reason. Wore an ace wrap on his arm. Had  swelling from fingers to upper arm.  -KD      Patient/Caregiver Goals  Decrease swelling  -KD      Hand Dominance  right-handed  -KD      How has patient tried to help current problem?  -- used ace wraps on arm  -KD         Pain     Pain Location  Shoulder both  -KD      Pain at Present  0 no pain if still  -KD      Pain at Best  0  -KD      Pain at Worst  10 bad pain when moving shoulders  -KD      Difficulties with ADL's?  Cifficulty with some daily tasks  -KD         Fall Risk Assessment    Any falls in the past year:  Yes  -KD      Number of falls reported in the last 12 months  -- 2-3  -KD      Factors that contributed to the fall:  Lost balance  -KD         Services    Prior Rehab/Home Health Experiences  No just stopped receiving it  -KD         Daily Activities    Primary Language  English  -KD      How does patient learn best?  Reading  -KD      Teaching needs identified  Management of Condition  -KD      Patient is concerned about/has problems with  Difficulty with self care (i.e. bathing, dressing, toileting:  -KD      Does patient have problems with the following?  None  -KD      Barriers to learning  None  -KD      Pt Participated in POC and Goals  Yes  -KD         Safety    Are you being hurt, hit, or frightened by anyone at home or in your life?  No  -KD      Are you being neglected by a caregiver  No  -KD        User Key  (r) = Recorded By, (t) = Taken By, (c) = Cosigned By    Initials Name Provider Type    KD Avril Lyman, PT Physical Therapist          Lymphedema     Row Name 07/16/19 1000             Subjective Pain    Able to rate subjective pain?  yes  -KD      Pre-Treatment Pain Level  0  -KD      Post-Treatment Pain Level  0  -KD      Subjective Pain Comment  Has 4-5 pain in elbow when moving it  -KD         Subjective Comments    Subjective Comments  Lives with his son. Uses walker at home, wheelchair when out.  -KD         Lymphedema Assessment    Lymphedema Classification  LUE:;stage 1  (Spontaneously Reversible)  -KD      Infections or Cellulitis?  no  -KD         Posture/Observations    Posture/Observations Comments  Noted bandage on left upper arm from abrasion.   -KD         General ROM    GENERAL ROM COMMENTS  Limited B UE ROM  -KD         MMT (Manual Muscle Testing)    General MMT Comments  Weakness noted B UE's  -KD         Lymphedema Edema Assessment    Edema Assessment Comment  Very min soft edema left UE, zee around elbow.  -KD         Skin Changes/Observations    Skin Observations Comment  Intact, clean, fragile  -KD         Lymphedema Sensation    Lymphedema Sensation Comments  Intact; no N/T  -KD         Lymphedema Measurements    Measurement Type(s)  Circumferential  -KD      Circumferential Areas  Upper extremities  -KD         BUE Circumferential (cm)    Measurement Location 1  Axilla(20cm above elbow)  -KD      Left 1  28 cm  -KD      Right 1  29 cm  -KD      Measurement Location 2  15cm above elbow  -KD      Left 2  26.7 cm  -KD      Right 2  27 cm  -KD      Measurement Location 3  10cm above elbow  -KD      Left 3  24 cm  -KD      Right 3  27 cm  -KD      Measurement Location 4  5cm above elbow  -KD      Left 4  24 cm  -KD      Right 4  26 cm  -KD      Measurement Location 5  Elbow  -KD      Left 5  27 cm  -KD      Right 5  29.5 cm  -KD      Measurement Location 6  5cm below elbow  -KD      Left 6  25.5 cm  -KD      Right 6  24.5 cm  -KD      Measurement Location 7  10cm below elbow  -KD      Left 7  23.5 cm  -KD      Right 7  22.5 cm  -KD      Measurement Location 8  15cm below elbow  -KD      Left 8  19.3 cm  -KD      Right 8  19.7 cm  -KD      Measurement Location 9  20cm below elbow  -KD      Left 9  17 cm  -KD      Right 9  18 cm  -KD      Measurement Location 10  Wrist  -KD      Left 10  18 cm  -KD      Right 10  19 cm  -KD      Measurement Location 11  Mid palm  -KD      Left 11  21 cm  -KD      Right 11  21.5 cm  -KD      Measurement Location 14  *pt had bandage around  right upper arm  -KD      LUE Circumferential Total  254 cm  -KD      RUE Circumferential Total  263.7 cm  -KD        User Key  (r) = Recorded By, (t) = Taken By, (c) = Cosigned By    Initials Name Provider Type    Avril Ball, PT Physical Therapist                          Therapy Education  Education Details: Reviewed treatment protocol, discussed condition; recommended obtaining a compression sleeve from [employer].  Given: Symptoms/condition management, Edema management  Program: New  How Provided: Verbal, Written  Provided to: Patient, Caregiver(daughter)  Level of Understanding: Verbalized      Exercises     Row Name 07/16/19 1000             Subjective Comments    Subjective Comments  Lives with his son. Uses walker at home, wheelchair when out.  -KD         Subjective Pain    Able to rate subjective pain?  yes  -KD      Pre-Treatment Pain Level  0  -KD      Post-Treatment Pain Level  0  -KD      Subjective Pain Comment  Has 4-5 pain in elbow when moving it  -KD        User Key  (r) = Recorded By, (t) = Taken By, (c) = Cosigned By    Initials Name Provider Type    Avril Ball, PT Physical Therapist                      PT OP Goals     Row Name 07/16/19 1000          PT Short Term Goals    STG Date to Achieve  07/16/19  -KD     STG 1  Pt/daughter to have good understanding of use and wear of compression sleeve.  -KD     STG 1 Progress  New  -KD     STG 1 Progress Comments  Pt/daughter are to obtain a light compression sleeve per [employer]  -KD        Time Calculation    PT Goal Re-Cert Due Date  10/14/19  -KD       User Key  (r) = Recorded By, (t) = Taken By, (c) = Cosigned By    Initials Name Provider Type    Avril Ball, PT Physical Therapist          PT Assessment/Plan     Row Name 07/16/19 1228          PT Assessment    Functional Limitations  Performance in self-care ADL;Impaired locomotion;Performance in leisure activities  -KD     Impairments  Impaired lymphatic circulation;Pain;Joint  mobility;Edema  -KD     Assessment Comments  Pt presents in the Lymphedema Clinic today with mild edema in his left upper extremity, zee around and just distal to his elbow. Pt and daughter state his edema has decreased significantly recently, zee since his water pill was increased and they have been applying an ace bandage. His left upper only measures a couple of cm larger than his right around the elbow area, otherwise about the same. His physical limitations seem more due to arthritis than to edema currently. At this time it is recommended for pt to go to [employer] and obtain a light compression garment (20-30mmHg would be preferred, but 15-20 is probably more realistic considering his fragile skin) and wear it daily. He and his daughter were advised that if his arm became more edematous to contact us and we will re-assess and begin therapy if needed.  -KD     Please refer to paper survey for additional self-reported information  Yes  -KD     Rehab Potential  Good  -KD     Patient/caregiver participated in establishment of treatment plan and goals  Yes  -KD     Patient would benefit from skilled therapy intervention  Yes  -KD        PT Plan    PT Frequency  One time visit  -KD     Planned CPT's?  PT EVAL MOD COMPLELITY: 42226  -KD     PT Plan Comments  Only see pt again if he has any issues, otherwise he's to wear compression garment daily to maintain current status of edema.  -KD       User Key  (r) = Recorded By, (t) = Taken By, (c) = Cosigned By    Initials Name Provider Type    KD Avril Lyman, PT Physical Therapist                       Time Calculation:   Start Time: 0953  Stop Time: 1033  Time Calculation (min): 40 min   Therapy Charges for Today     Code Description Service Date Service Provider Modifiers Qty    [id number]  PT EVAL MOD COMPLEXITY 2 7/16/2019 Avril Lyman, PT GP 1                    Avril Lyman, PT  7/16/2019

## 2019-07-23 ENCOUNTER — OFFICE VISIT (OUTPATIENT)
Dept: CARDIOLOGY | Facility: CLINIC | Age: 84
End: 2019-07-23

## 2019-07-23 VITALS
OXYGEN SATURATION: 91 % | BODY MASS INDEX: 25.28 KG/M2 | HEIGHT: 70 IN | DIASTOLIC BLOOD PRESSURE: 62 MMHG | SYSTOLIC BLOOD PRESSURE: 101 MMHG | HEART RATE: 64 BPM | WEIGHT: 176.6 LBS

## 2019-07-23 DIAGNOSIS — Z95.0 PRESENCE OF CARDIAC PACEMAKER: ICD-10-CM

## 2019-07-23 DIAGNOSIS — I95.1 AUTONOMIC ORTHOSTATIC HYPOTENSION: ICD-10-CM

## 2019-07-23 DIAGNOSIS — I48.21 PERMANENT ATRIAL FIBRILLATION (HCC): ICD-10-CM

## 2019-07-23 DIAGNOSIS — I35.0 NONRHEUMATIC AORTIC VALVE STENOSIS: Primary | ICD-10-CM

## 2019-07-23 PROBLEM — I89.0 LYMPHATIC EDEMA: Status: ACTIVE | Noted: 2019-07-23

## 2019-07-23 PROCEDURE — 99214 OFFICE O/P EST MOD 30 MIN: CPT | Performed by: INTERNAL MEDICINE

## 2019-07-23 NOTE — ASSESSMENT & PLAN NOTE
Previous echo demonstrated moderate aortic stenosis.  I am sure this is the etiology of his dyspnea on exertion along with his advanced age

## 2019-07-23 NOTE — PROGRESS NOTES
Subjective:     Encounter Date:07/23/2019      Patient ID: Robert Cheek is a 89 y.o. male.    Chief Complaint:  Chief Complaint   Patient presents with   • Follow-up   • Shortness of Breath     on exertion   • Fatigue       HPI:  I saw Mr. Cheek in the office today.  He is a 89-year-old gentleman with valvular aortic stenosis, aortic root dilatation, permanent atrial fibrillation.  Permanent pacemaker.  History of essential hypertension.  Dyslipidemia.  Autonomic orthostatic hypotension, chronic kidney disease and lymphedema.  On his previous office visit, Mr. Cheek was having significant problems with upper extremity lymphedema.  He was seen in clinic and his arms have been wrapped and his lymphedema has improved.  He states he has not worn the wraps for approximately 2 weeks.  He does have some mild lymphedema but it has not progressed to the point that it was on his last visit.  He does complain of exertional dyspnea after approximately 100 feet.  He does complain of being fatigued and unsteady on his feet.  He has had 2 falls since his previous visit.  He is not experiencing syncope but he does lose his balance as he turns to get back in bed.    The following portions of the patient's history were reviewed and updated as appropriate: allergies, current medications, past family history, past medical history, past social history, past surgical history and problem list.    Problem List:  Patient Active Problem List   Diagnosis   • Acute pain of right shoulder   • Aortic root dilatation (CMS/HCC)   • Aortic valve stenosis   • Arthritis   • Benign essential tremor   • CAD (coronary artery disease)   • Chronic kidney disease   • Congestive heart failure (CMS/HCC)   • Gastritis   • GERD (gastroesophageal reflux disease)   • Tachycardia   • Sick sinus syndrome (CMS/HCC)   • Permanent atrial fibrillation (CMS/HCC)   • Paroxysmal atrial flutter (CMS/HCC)   • PAF (paroxysmal atrial fibrillation) (CMS/HCC)   • NPH  (normal pressure hydrocephalus)   • Hypertension   • Hyperlipidemia   • Hypercholesteremia   • Hydrocephalus   • Edema   • Presence of cardiac pacemaker   • Autonomic orthostatic hypotension   • Lymphatic edema       Past Medical History:  Past Medical History:   Diagnosis Date   • Aortic root dilatation (CMS/HCC)    • Aortic valve stenosis    • Atrial fibrillation (CMS/HCC)    • CHF (congestive heart failure) (CMS/[facility])    • Chronic kidney disease    • GERD (gastroesophageal reflux disease)    • Hydrocephalus    • Hyperlipidemia    • Hypertension    • SSS (sick sinus syndrome) (CMS/[facility])        Past Surgical History:  Past Surgical History:   Procedure Laterality Date   • CSF SHUNT  05/2017   • HERNIA REPAIR     • INSERT / REPLACE / REMOVE PACEMAKER     • KNEE SURGERY Left 2012   • PACEMAKER REPLACEMENT  2006       Social History:  Social History     Socioeconomic History   • Marital status:      Spouse name: Not on file   • Number of children: Not on file   • Years of education: Not on file   • Highest education level: Not on file   Tobacco Use   • Smoking status: Never Smoker       Allergies:  Allergies   Allergen Reactions   • Adhesive Tape Unknown (See Comments)     Redness, raw   • Dabigatran Etexilate Mesylate Unknown (See Comments) and Nausea And Vomiting   • Ibuprofen Unknown (See Comments)   • Indomethacin Nausea And Vomiting and Unknown (See Comments)   • Simvastatin Nausea And Vomiting and Unknown (See Comments)           ROS:  Review of Systems   Constitution: Positive for malaise/fatigue. Negative for chills, decreased appetite, fever, weight gain and weight loss.   HENT: Negative for congestion, hoarse voice, nosebleeds and sore throat.    Eyes: Negative for blurred vision, double vision and visual disturbance.   Cardiovascular: Positive for dyspnea on exertion and leg swelling. Negative for chest pain, claudication, irregular heartbeat, near-syncope, orthopnea, palpitations, paroxysmal nocturnal  "dyspnea and syncope.   Respiratory: Negative for cough, hemoptysis, shortness of breath, sleep disturbances due to breathing, snoring, sputum production and wheezing.    Endocrine: Negative for cold intolerance, heat intolerance, polydipsia and polyuria.   Hematologic/Lymphatic: Negative for adenopathy and bleeding problem. Bruises/bleeds easily.   Skin: Negative for flushing, itching, nail changes and rash.   Musculoskeletal: Positive for arthritis. Negative for back pain, joint pain, muscle cramps, muscle weakness, myalgias and neck pain.   Gastrointestinal: Positive for constipation. Negative for bloating, abdominal pain, anorexia, change in bowel habit, diarrhea, heartburn, hematemesis, hematochezia, jaundice, melena, nausea and vomiting.   Genitourinary: Negative for dysuria, hematuria and nocturia.   Neurological: Negative for brief paralysis, disturbances in coordination, excessive daytime sleepiness, dizziness, headaches, light-headedness, loss of balance, numbness, paresthesias, seizures and vertigo.        NPH   Psychiatric/Behavioral: Positive for depression. Negative for altered mental status. The patient is not nervous/anxious.    Allergic/Immunologic: Negative for environmental allergies and hives.          Objective:         /62   Pulse 64   Ht 177.8 cm (70\")   Wt 80.1 kg (176 lb 9.6 oz)   SpO2 91%   BMI 25.34 kg/m²     Physical Exam   Constitutional: He is oriented to person, place, and time. No distress.   Elderly male, slightly disheveled.  No acute distress.  Alert and conversational   HENT:   Head: Macrocephalic.   Shunt is noted in the right parietal area   Eyes: Conjunctivae and EOM are normal. Pupils are equal, round, and reactive to light. No scleral icterus.   Neck: Normal range of motion. Neck supple. No thyromegaly present.   Cardiovascular: Normal rate, regular rhythm, S1 normal, S2 normal and intact distal pulses.  No extrasystoles are present. PMI is not displaced. Exam " reveals no gallop, no S3, no S4, no friction rub and no decreased pulses.   Murmur ( There is a 2/6 to 3/6 systolic murmur heard at the upper right sternal border) heard.  Pulses:       Carotid pulses are 2+ on the right side, and 2+ on the left side.       Dorsalis pedis pulses are 1+ on the right side, and 1+ on the left side.        Posterior tibial pulses are 1+ on the right side, and 1+ on the left side.   Pulmonary/Chest: Effort normal and breath sounds normal. No respiratory distress. He has no wheezes. He has no rales.   Abdominal: Soft. Bowel sounds are normal. He exhibits no distension and no mass. There is no tenderness. There is no rebound and no guarding.   Musculoskeletal: Normal range of motion. He exhibits no edema.   Lymphadenopathy:     He has no cervical adenopathy.   Neurological: He is alert and oriented to person, place, and time. Coordination normal.   Skin: Skin is warm and dry. No rash noted. He is not diaphoretic. No pallor.   Skin is very thin and has multiple bruises and occasional tears are noted   Psychiatric: He has a normal mood and affect. His behavior is normal.   Nursing note and vitals reviewed.      In-Office Procedure(s):  Procedures    ASCVD RIsk Score::  The ASCVD Risk score (Boris DC Jr., et al., 2013) failed to calculate for the following reasons:    The 2013 ASCVD risk score is only valid for ages 40 to 79    Recent Radiology:  Imaging Results (most recent)     None          Lab Review:   not applicable           Invalid input(s): ALKPO4                        Invalid input(s): LDLCALC                  Problems Addressed this Visit        Cardiovascular and Mediastinum    Aortic valve stenosis - Primary     Previous echo demonstrated moderate aortic stenosis.  I am sure this is the etiology of his dyspnea on exertion along with his advanced age         Permanent atrial fibrillation (CMS/HCC)     His rate is controlled.  He is not on warfarin secondary to his history of  recurrent falls         Presence of cardiac pacemaker     I did not interrogate his pacemaker today.  This was performed a few weeks ago.         Autonomic orthostatic hypotension     This is been somewhat stable since his previous visit.  He does not report any episodes of orthostasis           Overall, I would looks better than he has on the last 2-3 visits.  We had a long conversation regarding his aortic stenosis and potential progression of aortic stenosis.  He is not a surgical candidate.  I did discuss TAVR with him.  He declined to answer whether he would want to proceed along those lines.  He did voice that he is clearly frustrated with being dependent on others and not being able to do the things that he used to    Marquita Luna MD  07/23/19  .

## 2019-07-23 NOTE — ASSESSMENT & PLAN NOTE
This is been somewhat stable since his previous visit.  He does not report any episodes of orthostasis

## 2019-10-08 ENCOUNTER — APPOINTMENT (OUTPATIENT)
Dept: PREADMISSION TESTING | Facility: HOSPITAL | Age: 84
End: 2019-10-08

## 2019-10-08 VITALS
TEMPERATURE: 97.9 F | HEART RATE: 76 BPM | WEIGHT: 180 LBS | HEIGHT: 70 IN | SYSTOLIC BLOOD PRESSURE: 140 MMHG | OXYGEN SATURATION: 99 % | BODY MASS INDEX: 25.77 KG/M2 | RESPIRATION RATE: 18 BRPM | DIASTOLIC BLOOD PRESSURE: 80 MMHG

## 2019-10-08 LAB
ANION GAP SERPL CALCULATED.3IONS-SCNC: 10.2 MMOL/L (ref 5–15)
BUN BLD-MCNC: 25 MG/DL (ref 8–23)
BUN/CREAT SERPL: 13 (ref 7–25)
CALCIUM SPEC-SCNC: 8.5 MG/DL (ref 8.2–9.6)
CHLORIDE SERPL-SCNC: 105 MMOL/L (ref 98–107)
CO2 SERPL-SCNC: 26.8 MMOL/L (ref 22–29)
CREAT BLD-MCNC: 1.92 MG/DL (ref 0.76–1.27)
DEPRECATED RDW RBC AUTO: 46.2 FL (ref 37–54)
ERYTHROCYTE [DISTWIDTH] IN BLOOD BY AUTOMATED COUNT: 13.1 % (ref 12.3–15.4)
GFR SERPL CREATININE-BSD FRML MDRD: 33 ML/MIN/1.73
GLUCOSE BLD-MCNC: 101 MG/DL (ref 65–99)
HCT VFR BLD AUTO: 34.4 % (ref 37.5–51)
HGB BLD-MCNC: 11.9 G/DL (ref 13–17.7)
MCH RBC QN AUTO: 33.1 PG (ref 26.6–33)
MCHC RBC AUTO-ENTMCNC: 34.6 G/DL (ref 31.5–35.7)
MCV RBC AUTO: 95.8 FL (ref 79–97)
PLATELET # BLD AUTO: 235 10*3/MM3 (ref 140–450)
PMV BLD AUTO: 10.4 FL (ref 6–12)
POTASSIUM BLD-SCNC: 4.6 MMOL/L (ref 3.5–5.2)
RBC # BLD AUTO: 3.59 10*6/MM3 (ref 4.14–5.8)
SODIUM BLD-SCNC: 142 MMOL/L (ref 136–145)
WBC NRBC COR # BLD: 6.27 10*3/MM3 (ref 3.4–10.8)

## 2019-10-08 PROCEDURE — 93005 ELECTROCARDIOGRAM TRACING: CPT

## 2019-10-08 PROCEDURE — 85027 COMPLETE CBC AUTOMATED: CPT | Performed by: OPHTHALMOLOGY

## 2019-10-08 PROCEDURE — 80048 BASIC METABOLIC PNL TOTAL CA: CPT | Performed by: OPHTHALMOLOGY

## 2019-10-08 PROCEDURE — 36415 COLL VENOUS BLD VENIPUNCTURE: CPT

## 2019-10-08 PROCEDURE — 93010 ELECTROCARDIOGRAM REPORT: CPT | Performed by: INTERNAL MEDICINE

## 2019-10-08 RX ORDER — ATORVASTATIN CALCIUM 20 MG/1
20 TABLET, FILM COATED ORAL NIGHTLY
COMMUNITY
End: 2020-01-01 | Stop reason: SDUPTHER

## 2019-10-08 RX ORDER — MULTIVIT-MIN/IRON/FOLIC ACID/K 18-600-40
CAPSULE ORAL
COMMUNITY

## 2019-10-08 RX ORDER — FUROSEMIDE 20 MG/1
20 TABLET ORAL DAILY
COMMUNITY
End: 2019-11-19 | Stop reason: DRUGHIGH

## 2019-10-08 RX ORDER — CALCITRIOL 0.25 UG/1
0.25 CAPSULE, LIQUID FILLED ORAL NIGHTLY
COMMUNITY

## 2019-10-08 NOTE — DISCHARGE INSTRUCTIONS
Take the following medications the morning of surgery with a small sip of water:    METOPROLOL    ARRIVE AT 0630.      General Instructions:  • Do not eat solid food after midnight the night before surgery.  • You may drink clear liquids day of surgery but must stop at least one hour before your hospital arrival time.  • It is beneficial for you to have a clear drink that contains carbohydrates the day of surgery.  We suggest a 12 to 20 ounce bottle of Gatorade or Powerade for non-diabetic patients or a 12 to 20 ounce bottle of G2 or Powerade Zero for diabetic patients. (Pediatric patients, are not advised to drink a 12 to 20 ounce carbohydrate drink)    Clear liquids are liquids you can see through.  Nothing red in color.     Plain water                               Sports drinks  Sodas                                   Gelatin (Jell-O)  Fruit juices without pulp such as white grape juice and apple juice  Popsicles that contain no fruit or yogurt  Tea or coffee (no cream or milk added)  Gatorade / Powerade  G2 / Powerade Zero    • Infants may have breast milk up to four hours before surgery.  • Infants drinking formula may drink formula up to six hours before surgery.   • Patients who avoid smoking, chewing tobacco and alcohol for 4 weeks prior to surgery have a reduced risk of post-operative complications.  Quit smoking as many days before surgery as you can.  • Do not smoke, use chewing tobacco or drink alcohol the day of surgery.   • If applicable bring your C-PAP/ BI-PAP machine.  • Bring any papers given to you in the doctor’s office.  • Wear clean comfortable clothes.  • Do not wear contact lenses, false eyelashes or make-up.  Bring a case for your glasses.   • Bring crutches or walker if applicable.  • Remove all piercings.  Leave jewelry and any other valuables at home.  • Hair extensions with metal clips must be removed prior to surgery.  • The Pre-Admission Testing nurse will instruct you to bring  medications if unable to obtain an accurate list in Pre-Admission Testing.        If you were given a blood bank ID arm band remember to bring it with you the day of surgery.    Preventing a Surgical Site Infection:  • For 2 to 3 days before surgery, avoid shaving with a razor because the razor can irritate skin and make it easier to develop an infection.    • Any areas of open skin can increase the risk of a post-operative wound infection by allowing bacteria to enter and travel throughout the body.  Notify your surgeon if you have any skin wounds / rashes even if it is not near the expected surgical site.  The area will need assessed to determine if surgery should be delayed until it is healed.  • The night prior to surgery sleep in a clean bed with clean clothing.  Do not allow pets to sleep with you.  • Shower on the morning of surgery using a fresh bar of anti-bacterial soap (such as Dial) and clean washcloth.  Dry with a clean towel and dress in clean clothing.  • Ask your surgeon if you will be receiving antibiotics prior to surgery.  • Make sure you, your family, and all healthcare providers clean their hands with soap and water or an alcohol based hand  before caring for you or your wound.    Day of surgery:  Your arrival time is approximately two hours before your scheduled surgery time.  Upon arrival, a Pre-op nurse and Anesthesiologist will review your health history, obtain vital signs, and answer questions you may have.  The only belongings needed at this time will be a list of your home medications and if applicable your C-PAP/BI-PAP machine.  If you are staying overnight your family can leave the rest of your belongings in the car and bring them to your room later.  A Pre-op nurse will start an IV and you may receive medication in preparation for surgery, including something to help you relax.  Your family will be able to see you in the Pre-op area.  Two visitors at a time will be allowed in  the Pre-op room.  While you are in surgery your family should notify the waiting room  if they leave the waiting room area and provide a contact phone number.    Please be aware that surgery does come with discomfort.  We want to make every effort to control your discomfort so please discuss any uncontrolled symptoms with your nurse.   Your doctor will most likely have prescribed pain medications.      If you are going home after surgery you will receive individualized written care instructions before being discharged.  A responsible adult must drive you to and from the hospital on the day of your surgery and stay with you for 24 hours.    If you are staying overnight following surgery, you will be transported to your hospital room following the recovery period.  [facility] has all private rooms.    You have received a list of surgical assistants for your reference.  If you have any questions please call Pre-Admission Testing at [phone].  Deductibles and co-payments are collected on the day of service. Please be prepared to pay the required co-pay, deductible or deposit on the day of service as defined by your plan.

## 2019-10-10 ENCOUNTER — APPOINTMENT (OUTPATIENT)
Dept: PREADMISSION TESTING | Facility: HOSPITAL | Age: 84
End: 2019-10-10

## 2019-10-16 ENCOUNTER — ANESTHESIA EVENT (OUTPATIENT)
Dept: PERIOP | Facility: HOSPITAL | Age: 84
End: 2019-10-16

## 2019-10-17 ENCOUNTER — HOSPITAL ENCOUNTER (OUTPATIENT)
Facility: HOSPITAL | Age: 84
Setting detail: HOSPITAL OUTPATIENT SURGERY
Discharge: HOME | End: 2019-10-17
Attending: OPHTHALMOLOGY | Admitting: OPHTHALMOLOGY

## 2019-10-17 ENCOUNTER — ANESTHESIA (OUTPATIENT)
Dept: PERIOP | Facility: HOSPITAL | Age: 84
End: 2019-10-17

## 2019-10-17 VITALS
HEART RATE: 66 BPM | DIASTOLIC BLOOD PRESSURE: 77 MMHG | RESPIRATION RATE: 16 BRPM | SYSTOLIC BLOOD PRESSURE: 143 MMHG | TEMPERATURE: 97.6 F | OXYGEN SATURATION: 98 %

## 2019-10-17 PROCEDURE — 25010000003 CEFAZOLIN 1-4 GM/50ML-% SOLUTION: Performed by: ANESTHESIOLOGY

## 2019-10-17 PROCEDURE — 25010000002 PROPOFOL 10 MG/ML EMULSION: Performed by: ANESTHESIOLOGY

## 2019-10-17 RX ORDER — LIDOCAINE HYDROCHLORIDE 10 MG/ML
0.5 INJECTION, SOLUTION EPIDURAL; INFILTRATION; INTRACAUDAL; PERINEURAL ONCE AS NEEDED
Status: DISCONTINUED | OUTPATIENT
Start: 2019-10-17 | End: 2019-10-17 | Stop reason: HOSPADM

## 2019-10-17 RX ORDER — CEFAZOLIN SODIUM 1 G/50ML
INJECTION, SOLUTION INTRAVENOUS AS NEEDED
Status: DISCONTINUED | OUTPATIENT
Start: 2019-10-17 | End: 2019-10-17 | Stop reason: SURG

## 2019-10-17 RX ORDER — ACETAMINOPHEN 650 MG/1
650 SUPPOSITORY RECTAL ONCE AS NEEDED
Status: COMPLETED | OUTPATIENT
Start: 2019-10-17 | End: 2019-10-17

## 2019-10-17 RX ORDER — SODIUM CHLORIDE 0.9 % (FLUSH) 0.9 %
3-10 SYRINGE (ML) INJECTION AS NEEDED
Status: DISCONTINUED | OUTPATIENT
Start: 2019-10-17 | End: 2019-10-17 | Stop reason: HOSPADM

## 2019-10-17 RX ORDER — ACETAMINOPHEN 325 MG/1
650 TABLET ORAL ONCE AS NEEDED
Status: COMPLETED | OUTPATIENT
Start: 2019-10-17 | End: 2019-10-17

## 2019-10-17 RX ORDER — MAGNESIUM HYDROXIDE 1200 MG/15ML
LIQUID ORAL AS NEEDED
Status: DISCONTINUED | OUTPATIENT
Start: 2019-10-17 | End: 2019-10-17 | Stop reason: HOSPADM

## 2019-10-17 RX ORDER — MIDAZOLAM HYDROCHLORIDE 1 MG/ML
2 INJECTION INTRAMUSCULAR; INTRAVENOUS
Status: DISCONTINUED | OUTPATIENT
Start: 2019-10-17 | End: 2019-10-17 | Stop reason: HOSPADM

## 2019-10-17 RX ORDER — HYDROCODONE BITARTRATE AND ACETAMINOPHEN 7.5; 325 MG/1; MG/1
1 TABLET ORAL ONCE AS NEEDED
Status: DISCONTINUED | OUTPATIENT
Start: 2019-10-17 | End: 2019-10-17 | Stop reason: HOSPADM

## 2019-10-17 RX ORDER — TETRACAINE HYDROCHLORIDE 5 MG/ML
SOLUTION OPHTHALMIC AS NEEDED
Status: DISCONTINUED | OUTPATIENT
Start: 2019-10-17 | End: 2019-10-17 | Stop reason: HOSPADM

## 2019-10-17 RX ORDER — SODIUM CHLORIDE, SODIUM LACTATE, POTASSIUM CHLORIDE, CALCIUM CHLORIDE 600; 310; 30; 20 MG/100ML; MG/100ML; MG/100ML; MG/100ML
9 INJECTION, SOLUTION INTRAVENOUS CONTINUOUS
Status: DISCONTINUED | OUTPATIENT
Start: 2019-10-17 | End: 2019-10-17 | Stop reason: HOSPADM

## 2019-10-17 RX ORDER — PROPOFOL 10 MG/ML
VIAL (ML) INTRAVENOUS AS NEEDED
Status: DISCONTINUED | OUTPATIENT
Start: 2019-10-17 | End: 2019-10-17 | Stop reason: SURG

## 2019-10-17 RX ORDER — PROMETHAZINE HYDROCHLORIDE 25 MG/ML
12.5 INJECTION, SOLUTION INTRAMUSCULAR; INTRAVENOUS ONCE AS NEEDED
Status: DISCONTINUED | OUTPATIENT
Start: 2019-10-17 | End: 2019-10-17 | Stop reason: HOSPADM

## 2019-10-17 RX ORDER — FAMOTIDINE 10 MG/ML
20 INJECTION, SOLUTION INTRAVENOUS ONCE
Status: COMPLETED | OUTPATIENT
Start: 2019-10-17 | End: 2019-10-17

## 2019-10-17 RX ORDER — HYDRALAZINE HYDROCHLORIDE 20 MG/ML
5 INJECTION INTRAMUSCULAR; INTRAVENOUS
Status: DISCONTINUED | OUTPATIENT
Start: 2019-10-17 | End: 2019-10-17 | Stop reason: HOSPADM

## 2019-10-17 RX ORDER — EPHEDRINE SULFATE 50 MG/ML
5 INJECTION, SOLUTION INTRAVENOUS ONCE AS NEEDED
Status: DISCONTINUED | OUTPATIENT
Start: 2019-10-17 | End: 2019-10-17 | Stop reason: HOSPADM

## 2019-10-17 RX ORDER — PROMETHAZINE HYDROCHLORIDE 25 MG/1
25 TABLET ORAL ONCE AS NEEDED
Status: DISCONTINUED | OUTPATIENT
Start: 2019-10-17 | End: 2019-10-17 | Stop reason: HOSPADM

## 2019-10-17 RX ORDER — NALOXONE HCL 0.4 MG/ML
0.2 VIAL (ML) INJECTION AS NEEDED
Status: DISCONTINUED | OUTPATIENT
Start: 2019-10-17 | End: 2019-10-17 | Stop reason: HOSPADM

## 2019-10-17 RX ORDER — PROMETHAZINE HYDROCHLORIDE 25 MG/ML
6.25 INJECTION, SOLUTION INTRAMUSCULAR; INTRAVENOUS
Status: DISCONTINUED | OUTPATIENT
Start: 2019-10-17 | End: 2019-10-17 | Stop reason: HOSPADM

## 2019-10-17 RX ORDER — FLUMAZENIL 0.1 MG/ML
0.2 INJECTION INTRAVENOUS AS NEEDED
Status: DISCONTINUED | OUTPATIENT
Start: 2019-10-17 | End: 2019-10-17 | Stop reason: HOSPADM

## 2019-10-17 RX ORDER — FENTANYL CITRATE 50 UG/ML
50 INJECTION, SOLUTION INTRAMUSCULAR; INTRAVENOUS
Status: DISCONTINUED | OUTPATIENT
Start: 2019-10-17 | End: 2019-10-17 | Stop reason: HOSPADM

## 2019-10-17 RX ORDER — SODIUM CHLORIDE 0.9 % (FLUSH) 0.9 %
3 SYRINGE (ML) INJECTION EVERY 12 HOURS SCHEDULED
Status: DISCONTINUED | OUTPATIENT
Start: 2019-10-17 | End: 2019-10-17 | Stop reason: HOSPADM

## 2019-10-17 RX ORDER — ONDANSETRON 2 MG/ML
4 INJECTION INTRAMUSCULAR; INTRAVENOUS ONCE AS NEEDED
Status: DISCONTINUED | OUTPATIENT
Start: 2019-10-17 | End: 2019-10-17 | Stop reason: HOSPADM

## 2019-10-17 RX ORDER — LIDOCAINE HYDROCHLORIDE 20 MG/ML
INJECTION, SOLUTION INFILTRATION; PERINEURAL AS NEEDED
Status: DISCONTINUED | OUTPATIENT
Start: 2019-10-17 | End: 2019-10-17 | Stop reason: SURG

## 2019-10-17 RX ORDER — OXYCODONE AND ACETAMINOPHEN 7.5; 325 MG/1; MG/1
1 TABLET ORAL ONCE AS NEEDED
Status: DISCONTINUED | OUTPATIENT
Start: 2019-10-17 | End: 2019-10-17 | Stop reason: HOSPADM

## 2019-10-17 RX ORDER — LABETALOL HYDROCHLORIDE 5 MG/ML
5 INJECTION, SOLUTION INTRAVENOUS
Status: DISCONTINUED | OUTPATIENT
Start: 2019-10-17 | End: 2019-10-17 | Stop reason: HOSPADM

## 2019-10-17 RX ORDER — ERYTHROMYCIN 5 MG/G
OINTMENT OPHTHALMIC 2 TIMES DAILY
Qty: 3.5 G | Refills: 1 | Status: SHIPPED | OUTPATIENT
Start: 2019-10-17 | End: 2019-11-19 | Stop reason: DRUGHIGH

## 2019-10-17 RX ORDER — HYDROMORPHONE HYDROCHLORIDE 1 MG/ML
0.5 INJECTION, SOLUTION INTRAMUSCULAR; INTRAVENOUS; SUBCUTANEOUS
Status: DISCONTINUED | OUTPATIENT
Start: 2019-10-17 | End: 2019-10-17 | Stop reason: HOSPADM

## 2019-10-17 RX ORDER — ERYTHROMYCIN 5 MG/G
OINTMENT OPHTHALMIC AS NEEDED
Status: DISCONTINUED | OUTPATIENT
Start: 2019-10-17 | End: 2019-10-17 | Stop reason: HOSPADM

## 2019-10-17 RX ORDER — PROMETHAZINE HYDROCHLORIDE 25 MG/1
25 SUPPOSITORY RECTAL ONCE AS NEEDED
Status: DISCONTINUED | OUTPATIENT
Start: 2019-10-17 | End: 2019-10-17 | Stop reason: HOSPADM

## 2019-10-17 RX ORDER — DIPHENHYDRAMINE HCL 25 MG
25 CAPSULE ORAL
Status: DISCONTINUED | OUTPATIENT
Start: 2019-10-17 | End: 2019-10-17 | Stop reason: HOSPADM

## 2019-10-17 RX ORDER — DIPHENHYDRAMINE HYDROCHLORIDE 50 MG/ML
12.5 INJECTION INTRAMUSCULAR; INTRAVENOUS
Status: DISCONTINUED | OUTPATIENT
Start: 2019-10-17 | End: 2019-10-17 | Stop reason: HOSPADM

## 2019-10-17 RX ORDER — MIDAZOLAM HYDROCHLORIDE 1 MG/ML
1 INJECTION INTRAMUSCULAR; INTRAVENOUS
Status: DISCONTINUED | OUTPATIENT
Start: 2019-10-17 | End: 2019-10-17 | Stop reason: HOSPADM

## 2019-10-17 RX ADMIN — PROPOFOL 75 MCG/KG/MIN: 10 INJECTION, EMULSION INTRAVENOUS at 09:51

## 2019-10-17 RX ADMIN — FAMOTIDINE 20 MG: 10 INJECTION, SOLUTION INTRAVENOUS at 07:56

## 2019-10-17 RX ADMIN — PROPOFOL 20 MG: 10 INJECTION, EMULSION INTRAVENOUS at 10:09

## 2019-10-17 RX ADMIN — ACETAMINOPHEN 650 MG: 325 TABLET, FILM COATED ORAL at 10:46

## 2019-10-17 RX ADMIN — PROPOFOL 20 MG: 10 INJECTION, EMULSION INTRAVENOUS at 09:59

## 2019-10-17 RX ADMIN — PROPOFOL 20 MG: 10 INJECTION, EMULSION INTRAVENOUS at 10:03

## 2019-10-17 RX ADMIN — PROPOFOL 20 MG: 10 INJECTION, EMULSION INTRAVENOUS at 09:54

## 2019-10-17 RX ADMIN — PROPOFOL 20 MG: 10 INJECTION, EMULSION INTRAVENOUS at 09:44

## 2019-10-17 RX ADMIN — CEFAZOLIN SODIUM 2 G: 1 INJECTION, SOLUTION INTRAVENOUS at 09:55

## 2019-10-17 RX ADMIN — SODIUM CHLORIDE, POTASSIUM CHLORIDE, SODIUM LACTATE AND CALCIUM CHLORIDE: 600; 310; 30; 20 INJECTION, SOLUTION INTRAVENOUS at 09:38

## 2019-10-17 RX ADMIN — PROPOFOL 20 MG: 10 INJECTION, EMULSION INTRAVENOUS at 09:49

## 2019-10-17 RX ADMIN — LIDOCAINE HYDROCHLORIDE 60 MG: 20 INJECTION, SOLUTION INFILTRATION; PERINEURAL at 09:44

## 2019-10-17 NOTE — H&P
History & Physical       Patient: Robert Cheek    Date of Admission: 10/17/2019  6:22 AM    YOB: 1921    Medical Record Number: [id number]      Chief Complaints: Bilateral lower eyelid ectropion      History of Present Illness: 89 y.o. male presents with as above. No new meds/health problems since office visit      Allergies:   Allergies   Allergen Reactions   • Adhesive Tape Unknown (See Comments)     Redness, raw   • Dabigatran Etexilate Mesylate Nausea And Vomiting   • Ibuprofen GI Intolerance   • Indomethacin Nausea And Vomiting and Unknown (See Comments)   • Simvastatin Nausea And Vomiting and Unknown (See Comments)       10 point review of systems negative, except pertaining to the HPI    Medications:   Home Medications:  No current facility-administered medications on file prior to encounter.      Current Outpatient Medications on File Prior to Encounter   Medication Sig   • ferrous sulfate 325 (65 FE) MG tablet Take 325 mg by mouth 1 (One) Time Per Week. Take 1 tablet by mouth every Friday evening   • metoprolol tartrate (LOPRESSOR) 25 MG tablet Take 12.5 mg by mouth 2 (Two) Times a Day. Take one half tablet by mouth twice daily    • Misc. Devices (COMMODE BEDSIDE) misc 1 each.   • Probiotic Product (PROBIOTIC-10 PO) Probiotic   Use as directed.   • midodrine (PROAMATINE) 2.5 MG tablet Take 2.5 mg by mouth Daily.   • [DISCONTINUED] furosemide (LASIX) 40 MG tablet Take 40 mg by mouth 2 (Two) Times a Day.     Current Medications:  Scheduled Meds:  sodium chloride 3 mL Intravenous Q12H     Continuous Infusions:  lactated ringers 9 mL/hr     PRN Meds:.•  fentanyl  •  lidocaine PF 1%  •  midazolam **OR** midazolam  •  sodium chloride    Past Medical History:   Diagnosis Date   • Aortic root dilatation (CMS/HCC)    • Aortic valve stenosis    • Atrial fibrillation (CMS/HCC)    • CHF (congestive heart failure) (CMS/HCC)    • Chronic kidney disease    • GERD (gastroesophageal reflux disease)    •  Hydrocephalus (CMS/HCC)    • Hyperlipidemia    • Hypertension    • SSS (sick sinus syndrome) (CMS/HCC)         Past Surgical History:   Procedure Laterality Date   • CSF SHUNT  05/2017   • HERNIA REPAIR     • INSERT / REPLACE / REMOVE PACEMAKER     • KNEE SURGERY Left 2012   • PACEMAKER REPLACEMENT  2006        Social History     Occupational History   • Not on file   Tobacco Use   • Smoking status: Never Smoker   Substance and Sexual Activity   • Alcohol use: No     Frequency: Never   • Drug use: No   • Sexual activity: Not on file    Social History     Social History Narrative   • Not on file        Family History   Problem Relation Age of Onset   • Malig Hyperthermia Neg Hx            Physical Exam   Constitutional: Alert, cooperative, in no acute distress    Head: Normocephalic.   Eyes:    Bilateral lower eyelid ectropion  Neck: Normal range of motion.   Cardiovascular: Normal rate.    Pulmonary/Chest: Effort normal.   Neurological: Alert.   Skin: Skin is warm.   Psychiatric: Normal mood and affect.       Assessment/Plan:  The patient voiced understanding of the risks, benefits, and alternative forms of treatment that were discussed and the patient consents to proceed with BILATERAL LOWER LID ECTROPION REPAIR.       Iker Wise MD

## 2019-10-17 NOTE — ANESTHESIA PREPROCEDURE EVALUATION
Anesthesia Evaluation     Patient summary reviewed and Nursing notes reviewed   history of anesthetic complications: PONV               Airway   Dental    (+) poor dentition    Pulmonary - negative pulmonary ROS   Cardiovascular     ECG reviewed  Rhythm: irregular  Rate: normal    (+) pacemaker pacemaker, hypertension, valvular problems/murmurs AS, CAD, dysrhythmias Atrial Fib, CHF, hyperlipidemia,       Neuro/Psych- negative ROS  GI/Hepatic/Renal/Endo    (+)  GERD,  renal disease CRI,     Musculoskeletal     Abdominal    Substance History - negative use     OB/GYN negative ob/gyn ROS         Other   (+) arthritis                     Anesthesia Plan    ASA 4     MAC   (Ventricularly paced over chronic afib  Numerous medical pathos increasing waldo op risks  Very poor dentition)  intravenous induction   Anesthetic plan, all risks, benefits, and alternatives have been provided, discussed and informed consent has been obtained with: patient.

## 2019-10-17 NOTE — ANESTHESIA POSTPROCEDURE EVALUATION
Patient: Robert Cheek    Procedure Summary     Date:  10/17/19 Room / Location:   [facility]    Anesthesia Start:  0938 Anesthesia Stop:  1038    Procedure:  BILATERAL LOWER LID ECTROPION REPAIR (Bilateral Eye) Diagnosis:      Surgeon:  Iker Wise MD Provider:  Eddie Green MD    Anesthesia Type:  MAC ASA Status:  4          Anesthesia Type: MAC  Last vitals  BP   116/63 (10/17/19 1038)   Temp   36.4 °C (97.6 °F) (10/17/19 0715)   Pulse   71 (10/17/19 1038)   Resp   14 (10/17/19 1038)     SpO2   94 % (10/17/19 1038)     Post Anesthesia Care and Evaluation    Patient location during evaluation: bedside  Patient participation: complete - patient participated  Level of consciousness: awake  Pain score: 1  Pain management: adequate  Airway patency: patent  Anesthetic complications: No anesthetic complications  PONV Status: controlled  Cardiovascular status: acceptable  Respiratory status: acceptable  Hydration status: acceptable    Comments: --------------------            10/17/19               1038     --------------------   BP:       116/63     Pulse:      71       Resp:       14       Temp:                SpO2:      94%      --------------------

## 2019-10-17 NOTE — OP NOTE
OPERATIVE NOTE    Patient Identification:  Name: Robetr Cheek  Age: 89 y.o.  Sex: male  :  1921  MRN: [id number]                                               Preoperative diagnosis: 1.  Bilateral lower lid ectropion  Postoperative diagnosis: same  Procedure: 1.  Bilateral lower lid ectropion repair  Surgeon: Iker Wise MD who was present and scrubbed throughout all critical portions of the operation  Assistants: Chirag Mancuso MD  Anesthesia: Monitored with local  EBL: less than 50cc  Specimens: * No orders in the log *    Description of the procedure:     The patient was taken to the operating room and placed on the table in the supine position, where anesthesia was induced. 2% lidocaine with epinephrine and 0.5% marcaine in a 1:1 fashion was injected over the surgical site, and the patient was prepped and draped in the usual manner for orbitofacial surgery.     Corneal protectors were placed in both eyes.     A 15 Bard-Reji blade incision was made at the right lateral canthus. Sharp dissection was carried down to the lateral orbital rim periosteum. The inferior ramus of the lateral canthal tendon was identified and severed with sharp dissection. A full-thickness en bloc excision of the lateral aspect of the tarsal plate was carried out with sharp dissection, and bleeding was controlled with electrocauterization. The cut edge of the tarsal plate was advanced to the lateral orbital rim periosteum, where it was sutured with 5-0 vicryl suture to the internal aspect of the lateral orbital rim periosteum. The skin  was closed with 5-0 fast absorbing suture.     The exact same procedure was preformed over the contralateral lid    The corneal protectors were removed and antibiotic ophthalmic ointment was placed over the surgical site.     The patient was then awakened and taken from the operating room in good condition, having tolerated the procedure well. There were no complications, and the  estimated blood loss was less than 50 cc.

## 2019-11-07 ENCOUNTER — DOCUMENTATION (OUTPATIENT)
Dept: PHYSICAL THERAPY | Facility: HOSPITAL | Age: 84
End: 2019-11-07

## 2019-11-07 DIAGNOSIS — I89.0 LYMPHEDEMA: Primary | ICD-10-CM

## 2019-11-07 NOTE — THERAPY DISCHARGE NOTE
Outpatient Physical Therapy Discharge Summary         Patient Name: Robert Cheek  : 1921  MRN: [id number]    Today's Date: 2019    Visit Dx:    ICD-10-CM ICD-9-CM   1. Lymphedema I89.0 457.1       PT OP Goals     Row Name 19 1100          PT Short Term Goals    STG Date to Achieve  19  -KD     STG 1  Pt/daughter to have good understanding of use and wear of compression sleeve.  -KD     STG 1 Progress  Met  -KD     STG 2  Pt/daughter are to obtain a light compression sleeve per [facility]  -KD     STG 2 Progress  Met  -KD       User Key  (r) = Recorded By, (t) = Taken By, (c) = Cosigned By    Initials Name Provider Type    Avril Ball, PT Physical Therapist          OP PT Discharge Summary  Date of Discharge: 19  Reason for Discharge: All goals achieved  Outcomes Achieved: Able to achieve all goals within established timeline  Discharge Destination: Home with home program  Discharge Instructions/Additional Comments: Pt to obtain compression garment, wear daily as instructed.      Time Calculation:                    Avril Lyman, RICH  2019

## 2019-11-19 ENCOUNTER — OFFICE VISIT (OUTPATIENT)
Dept: CARDIOLOGY | Facility: CLINIC | Age: 84
End: 2019-11-19

## 2019-11-19 VITALS
BODY MASS INDEX: 25.91 KG/M2 | HEIGHT: 70 IN | WEIGHT: 181 LBS | DIASTOLIC BLOOD PRESSURE: 60 MMHG | SYSTOLIC BLOOD PRESSURE: 92 MMHG | HEART RATE: 72 BPM

## 2019-11-19 DIAGNOSIS — I10 ESSENTIAL HYPERTENSION: ICD-10-CM

## 2019-11-19 DIAGNOSIS — Z95.0 PRESENCE OF CARDIAC PACEMAKER: ICD-10-CM

## 2019-11-19 DIAGNOSIS — I48.21 PERMANENT ATRIAL FIBRILLATION (HCC): ICD-10-CM

## 2019-11-19 DIAGNOSIS — I35.0 NONRHEUMATIC AORTIC VALVE STENOSIS: Primary | ICD-10-CM

## 2019-11-19 DIAGNOSIS — I48.0 PAF (PAROXYSMAL ATRIAL FIBRILLATION) (HCC): ICD-10-CM

## 2019-11-19 DIAGNOSIS — I48.92 PAROXYSMAL ATRIAL FLUTTER (HCC): ICD-10-CM

## 2019-11-19 PROCEDURE — 99214 OFFICE O/P EST MOD 30 MIN: CPT | Performed by: INTERNAL MEDICINE

## 2019-11-19 PROCEDURE — 93288 INTERROG EVL PM/LDLS PM IP: CPT | Performed by: INTERNAL MEDICINE

## 2019-11-19 PROCEDURE — 93000 ELECTROCARDIOGRAM COMPLETE: CPT | Performed by: INTERNAL MEDICINE

## 2019-11-19 RX ORDER — FUROSEMIDE 40 MG/1
1.5 TABLET ORAL EVERY MORNING
COMMUNITY
Start: 2019-10-25 | End: 2020-01-01 | Stop reason: SDUPTHER

## 2019-11-19 NOTE — PROGRESS NOTES
Subjective:     Encounter Date:11/19/2019      Patient ID: Robert Cheek is a 89 y.o. male.    Chief Complaint:  Chief Complaint   Patient presents with   • Atrial Fibrillation   • Cardiac Valve Problem       HPI:  History of Present Illness  I had the pleasure of seeing Mr. Cheek in the office today.  He is a 89-year-old gentleman with valvular aortic stenosis, atrial fibrillation, permanent pacemaker, essential hypertension, dyslipidemia, orthostatic hypotension, chronic kidney disease, normal pressure hydrocephalus with shunt in place.    Mr. Cheek in the office today, accompanied by his son and daughter.  He actually looks quite well.  His major complaint is that of fatigue after walking up to 100 feet.  He also is noted to be short of air as well.  His daughter and son confirm that this has somewhat worsened over the past 6 months.  He is not complaining of chest tightness or heaviness.  He does wear compression stockings on his lower extremities and his edema has been fairly well controlled.  He has not fallen as much since his previous visit, but he is now more cautious about standing and he does use his walker more faithfully.  Mr. Cheek has been in atrial fibrillation for a prolonged period of time.  Interrogation of his pacemaker today reveals that in August 2019 his atrial fibrillation events essentially terminated and he is atrially pacing more often.    The following portions of the patient's history were reviewed and updated as appropriate: allergies, current medications, past family history, past medical history, past social history, past surgical history and problem list.    Problem List:  Patient Active Problem List   Diagnosis   • Acute pain of right shoulder   • Aortic root dilatation (CMS/HCC)   • Aortic valve stenosis   • Arthritis   • Benign essential tremor   • CAD (coronary artery disease)   • Chronic kidney disease   • Congestive heart failure (CMS/HCC)   • Gastritis   • GERD  (gastroesophageal reflux disease)   • Tachycardia   • Sick sinus syndrome (CMS/HCC)   • Permanent atrial fibrillation   • Paroxysmal atrial flutter (CMS/HCC)   • PAF (paroxysmal atrial fibrillation) (CMS/HCC)   • NPH (normal pressure hydrocephalus) (CMS/HCC)   • Hypertension   • Hyperlipidemia   • Hypercholesteremia   • Hydrocephalus (CMS/HCC)   • Edema   • Presence of cardiac pacemaker   • Autonomic orthostatic hypotension   • Lymphatic edema       Past Medical History:  Past Medical History:   Diagnosis Date   • Aortic root dilatation (CMS/HCC)    • Aortic valve stenosis    • Atrial fibrillation (CMS/HCC)    • CHF (congestive heart failure) (CMS/HCC)    • Chronic kidney disease    • GERD (gastroesophageal reflux disease)    • Hydrocephalus (CMS/HCC)    • Hyperlipidemia    • Hypertension    • SSS (sick sinus syndrome) (CMS/HCC)        Past Surgical History:  Past Surgical History:   Procedure Laterality Date   • CSF SHUNT  05/2017   • ECTROPION REPAIR Bilateral 10/17/2019    Procedure: BILATERAL LOWER LID ECTROPION REPAIR;  Surgeon: Iker Wise MD;  Location: [facility] OR [facility];  Service: Ophthalmology   • HERNIA REPAIR     • INSERT / REPLACE / REMOVE PACEMAKER     • KNEE SURGERY Left 2012   • PACEMAKER REPLACEMENT  2006       Social History:  Social History     Socioeconomic History   • Marital status:      Spouse name: Not on file   • Number of children: Not on file   • Years of education: Not on file   • Highest education level: Not on file   Tobacco Use   • Smoking status: Never Smoker   Substance and Sexual Activity   • Alcohol use: No     Frequency: Never   • Drug use: No       Allergies:  Allergies   Allergen Reactions   • Adhesive Tape Unknown (See Comments)     Redness, raw   • Dabigatran Etexilate Mesylate Nausea And Vomiting   • Ibuprofen GI Intolerance   • Indomethacin Nausea And Vomiting and Unknown (See Comments)   • Simvastatin Nausea And Vomiting and Unknown (See Comments)        Immunizations:  Immunization History   Administered Date(s) Administered   • Fluzone High Dose =>65 Years ([employer] ONLY) 09/06/2014   • Hepatitis A 07/06/2018   • Influenza LAIV (Nasal) 08/20/2012   • Pneumococcal Conjugate 13-Valent (PCV13) 10/07/2015       ROS:  Review of Systems   Constitution: Positive for malaise/fatigue. Negative for chills, decreased appetite, fever, weight gain and weight loss.   HENT: Negative for congestion, hoarse voice, nosebleeds and sore throat.    Eyes: Negative for blurred vision, double vision and visual disturbance.   Cardiovascular: Positive for dyspnea on exertion and leg swelling. Negative for chest pain, claudication, irregular heartbeat, near-syncope, orthopnea, palpitations, paroxysmal nocturnal dyspnea and syncope.   Respiratory: Negative for cough, hemoptysis, shortness of breath, sleep disturbances due to breathing, snoring, sputum production and wheezing.    Endocrine: Negative for cold intolerance, heat intolerance, polydipsia and polyuria.   Hematologic/Lymphatic: Negative for adenopathy and bleeding problem. Bruises/bleeds easily.   Skin: Negative for flushing, itching, nail changes and rash.   Musculoskeletal: Positive for arthritis. Negative for back pain, joint pain, muscle cramps, muscle weakness, myalgias and neck pain.   Gastrointestinal: Positive for constipation. Negative for bloating, abdominal pain, anorexia, change in bowel habit, diarrhea, heartburn, hematemesis, hematochezia, jaundice, melena, nausea and vomiting.   Genitourinary: Negative for dysuria, hematuria and nocturia.   Neurological: Negative for brief paralysis, disturbances in coordination, excessive daytime sleepiness, dizziness, headaches, light-headedness, loss of balance, numbness, paresthesias, seizures and vertigo.        NPH   Psychiatric/Behavioral: Positive for depression. Negative for altered mental status. The patient is not nervous/anxious.    Allergic/Immunologic: Negative for  "environmental allergies and hives.          Objective:         BP 92/60   Pulse 72   Ht 177.8 cm (70\")   Wt 82.1 kg (181 lb)   BMI 25.97 kg/m²     Physical Exam   Constitutional: He is oriented to person, place, and time. He appears well-developed and well-nourished. No distress.   Elderly male, slightly disheveled.  No acute distress.  Alert and conversational   HENT:   Head: Macrocephalic.   Shunt is noted in the right parietal area   Eyes: Conjunctivae and EOM are normal. Pupils are equal, round, and reactive to light. No scleral icterus.   Neck: Normal range of motion. Neck supple. No thyromegaly present.   Cardiovascular: Normal rate, regular rhythm, S1 normal, S2 normal and intact distal pulses.  No extrasystoles are present. PMI is not displaced. Exam reveals no gallop, no S3, no S4, no friction rub and no decreased pulses.   Murmur ( There is a  3/6 systolic murmur heard at the upper right sternal border ) heard.  Pulses:       Carotid pulses are 2+ on the right side, and 2+ on the left side.       Dorsalis pedis pulses are 2+ on the right side, and 2+ on the left side.        Posterior tibial pulses are 2+ on the right side, and 2+ on the left side.   Pulmonary/Chest: Effort normal and breath sounds normal. No respiratory distress. He has no wheezes. He has no rales.   Abdominal: Soft. Bowel sounds are normal. He exhibits no distension and no mass. There is no tenderness. There is no rebound and no guarding.   Musculoskeletal: He exhibits edema ( Wearing compression stockings. Mild bilateral lower extremity edema).   Slow gait   Lymphadenopathy:     He has no cervical adenopathy.   Neurological: He is alert and oriented to person, place, and time. Coordination normal.   Skin: Skin is warm and dry. No rash noted. He is not diaphoretic. No pallor.   Skin is very thin and has multiple bruises and occasional tears are noted   Psychiatric: He has a normal mood and affect. His behavior is normal.   Nursing " note and vitals reviewed.      In-Office Procedure(s):    ECG 12 Lead  Date/Time: 11/19/2019 4:42 PM  Performed by: Marquita Luna MD  Authorized by: Marquita Luna MD   Previous ECG: no previous ECG available  Comments: Atrial and ventricular paced rhythm            ASCVD RIsk Score::  The ASCVD Risk score (Boris ADITHYA Jr., et al., 2013) failed to calculate for the following reasons:    The 2013 ASCVD risk score is only valid for ages 40 to 79    Recent Radiology:  Imaging Results (Most Recent)     None          Lab Review:   not applicable             Assessment:          Diagnosis Plan   1. Nonrheumatic aortic valve stenosis     2. Essential hypertension     3. Presence of cardiac pacemaker     4. Permanent atrial fibrillation     5. Paroxysmal atrial flutter (CMS/HCC)     6. PAF (paroxysmal atrial fibrillation) (CMS/HCC)            Plan:   1.  Calcific valvular aortic stenosis  Mr. Cheek is experiencing more fatigue and dyspnea with exertion.  He is concerned that it may be related to his aortic valve.  He has known moderate aortic stenosis for quite some time and it certainly could have progressed.  I will obtain an echocardiogram to evaluate his gradient and valve area.  We did have a long discussion about TAVR.  I explained the procedure and possible complications associated with the procedure and the preprocedural requirements.    2.  Essential hypertension  Blood pressure is controlled and is actually low at times    3.  Pacemaker  He has a dual-chamber Medtronic pacemaker in place.  He demonstrates normal function.    4.  Atrial fibrillation  Mr. Cheek has had atrial fibrillation for well over a year which is been persistent and was felt to be current.  Interrogation of his device today reveals atrial pacing.  He is not on anticoagulation secondary to his falls.  This is been discussed in length with he and his daughter    Overall, Robert looks much better today.  He states that he feels well with the  exception of his dyspnea and fatigue.  He is very alert and able to speak for himself eloquently.      Level of Care:                 Marquita Luna MD  11/19/19  .

## 2020-01-01 ENCOUNTER — HOSPITAL ENCOUNTER (OUTPATIENT)
Dept: CARDIOLOGY | Facility: HOSPITAL | Age: 85
Discharge: HOME | End: 2020-08-04
Admitting: INTERNAL MEDICINE

## 2020-01-01 ENCOUNTER — TELEPHONE (OUTPATIENT)
Dept: CARDIOLOGY | Facility: CLINIC | Age: 85
End: 2020-01-01

## 2020-01-01 ENCOUNTER — TRANSCRIBE ORDERS (OUTPATIENT)
Dept: PHYSICAL THERAPY | Facility: HOSPITAL | Age: 85
End: 2020-01-01

## 2020-01-01 ENCOUNTER — APPOINTMENT (OUTPATIENT)
Dept: PHYSICAL THERAPY | Facility: HOSPITAL | Age: 85
End: 2020-01-01

## 2020-01-01 ENCOUNTER — OFFICE VISIT (OUTPATIENT)
Dept: CARDIOLOGY | Facility: CLINIC | Age: 85
End: 2020-01-01

## 2020-01-01 ENCOUNTER — HOSPITAL ENCOUNTER (OUTPATIENT)
Dept: CARDIOLOGY | Facility: HOSPITAL | Age: 85
Discharge: HOME | End: 2020-09-29
Admitting: NURSE PRACTITIONER

## 2020-01-01 ENCOUNTER — TRANSCRIBE ORDERS (OUTPATIENT)
Dept: ADMINISTRATIVE | Facility: HOSPITAL | Age: 85
End: 2020-01-01

## 2020-01-01 ENCOUNTER — APPOINTMENT (OUTPATIENT)
Dept: CT IMAGING | Facility: HOSPITAL | Age: 85
End: 2020-01-01

## 2020-01-01 ENCOUNTER — HOSPITAL ENCOUNTER (EMERGENCY)
Facility: HOSPITAL | Age: 85
Discharge: HOME | End: 2020-09-21
Attending: EMERGENCY MEDICINE | Admitting: EMERGENCY MEDICINE

## 2020-01-01 VITALS
HEART RATE: 83 BPM | SYSTOLIC BLOOD PRESSURE: 109 MMHG | WEIGHT: 179 LBS | BODY MASS INDEX: 25.62 KG/M2 | DIASTOLIC BLOOD PRESSURE: 65 MMHG | HEIGHT: 70 IN

## 2020-01-01 VITALS
OXYGEN SATURATION: 98 % | RESPIRATION RATE: 18 BRPM | TEMPERATURE: 97.5 F | DIASTOLIC BLOOD PRESSURE: 65 MMHG | SYSTOLIC BLOOD PRESSURE: 118 MMHG | HEART RATE: 67 BPM

## 2020-01-01 VITALS
SYSTOLIC BLOOD PRESSURE: 100 MMHG | BODY MASS INDEX: 25.68 KG/M2 | RESPIRATION RATE: 18 BRPM | DIASTOLIC BLOOD PRESSURE: 70 MMHG | WEIGHT: 179.4 LBS | HEART RATE: 73 BPM | HEIGHT: 70 IN

## 2020-01-01 DIAGNOSIS — R53.1 GENERALIZED WEAKNESS: Primary | ICD-10-CM

## 2020-01-01 DIAGNOSIS — Z95.0 PRESENCE OF CARDIAC PACEMAKER: ICD-10-CM

## 2020-01-01 DIAGNOSIS — G45.9 TIA (TRANSIENT ISCHEMIC ATTACK): Primary | ICD-10-CM

## 2020-01-01 DIAGNOSIS — I95.1 AUTONOMIC ORTHOSTATIC HYPOTENSION: ICD-10-CM

## 2020-01-01 DIAGNOSIS — I48.92 PAROXYSMAL ATRIAL FLUTTER (HCC): ICD-10-CM

## 2020-01-01 DIAGNOSIS — G45.9 TIA (TRANSIENT ISCHEMIC ATTACK): ICD-10-CM

## 2020-01-01 DIAGNOSIS — I89.0 LYMPHEDEMA: Primary | ICD-10-CM

## 2020-01-01 DIAGNOSIS — I35.0 AORTIC STENOSIS, MILD: ICD-10-CM

## 2020-01-01 DIAGNOSIS — I48.21 PERMANENT ATRIAL FIBRILLATION (HCC): ICD-10-CM

## 2020-01-01 DIAGNOSIS — I35.0 NONRHEUMATIC AORTIC VALVE STENOSIS: ICD-10-CM

## 2020-01-01 DIAGNOSIS — I10 ESSENTIAL HYPERTENSION: ICD-10-CM

## 2020-01-01 DIAGNOSIS — I35.0 NONRHEUMATIC AORTIC VALVE STENOSIS: Primary | ICD-10-CM

## 2020-01-01 LAB
ALBUMIN SERPL-MCNC: 3.7 G/DL (ref 3.5–5.2)
ALBUMIN/GLOB SERPL: 1.2 G/DL
ALP SERPL-CCNC: 146 U/L (ref 39–117)
ALT SERPL W P-5'-P-CCNC: 18 U/L (ref 1–41)
ANION GAP SERPL CALCULATED.3IONS-SCNC: 12.2 MMOL/L (ref 5–15)
AORTIC DIMENSIONLESS INDEX: 0.4 (DI)
AST SERPL-CCNC: 18 U/L (ref 1–40)
BACTERIA UR QL AUTO: ABNORMAL /HPF
BASOPHILS # BLD AUTO: 0.05 10*3/MM3 (ref 0–0.2)
BASOPHILS NFR BLD AUTO: 0.7 % (ref 0–1.5)
BH CV ECHO MEAS - ACS: 1.3 CM
BH CV ECHO MEAS - AI DEC SLOPE: 259 CM/SEC^2
BH CV ECHO MEAS - AI MAX PG: 43 MMHG
BH CV ECHO MEAS - AI MAX VEL: 328 CM/SEC
BH CV ECHO MEAS - AI P1/2T: 370.9 MSEC
BH CV ECHO MEAS - AO MAX PG: 20 MMHG
BH CV ECHO MEAS - AO MEAN PG (FULL): 9 MMHG
BH CV ECHO MEAS - AO MEAN PG: 11 MMHG
BH CV ECHO MEAS - AO ROOT AREA: 12.6 CM^2
BH CV ECHO MEAS - AO ROOT DIAM: 4 CM
BH CV ECHO MEAS - AO V2 MAX: 225 CM/SEC
BH CV ECHO MEAS - AO V2 MEAN: 152 CM/SEC
BH CV ECHO MEAS - AO V2 VTI: 56 CM
BH CV ECHO MEAS - AVA(I,A): 1.5 CM^2
BH CV ECHO MEAS - AVA(I,D): 1.5 CM^2
BH CV ECHO MEAS - EDV(CUBED): 42.9 ML
BH CV ECHO MEAS - EDV(MOD-SP4): 79 ML
BH CV ECHO MEAS - EDV(TEICH): 50.9 ML
BH CV ECHO MEAS - EF(CUBED): 54.1 %
BH CV ECHO MEAS - EF(MOD-BP): 75 %
BH CV ECHO MEAS - EF(MOD-SP4): 74.7 %
BH CV ECHO MEAS - EF(TEICH): 46.9 %
BH CV ECHO MEAS - ESV(CUBED): 19.7 ML
BH CV ECHO MEAS - ESV(MOD-SP4): 20 ML
BH CV ECHO MEAS - ESV(TEICH): 27 ML
BH CV ECHO MEAS - FS: 22.9 %
BH CV ECHO MEAS - IVS/LVPW: 0.92
BH CV ECHO MEAS - IVSD: 1.2 CM
BH CV ECHO MEAS - LV MASS(C)D: 144.6 GRAMS
BH CV ECHO MEAS - LV MAX PG: 3.8 MMHG
BH CV ECHO MEAS - LV MEAN PG: 2 MMHG
BH CV ECHO MEAS - LV V1 MAX: 99.7 CM/SEC
BH CV ECHO MEAS - LV V1 MEAN: 66.1 CM/SEC
BH CV ECHO MEAS - LV V1 VTI: 22.2 CM
BH CV ECHO MEAS - LVIDD: 3.5 CM
BH CV ECHO MEAS - LVIDS: 2.7 CM
BH CV ECHO MEAS - LVLD AP4: 6.4 CM
BH CV ECHO MEAS - LVLS AP4: 5.3 CM
BH CV ECHO MEAS - LVOT AREA (M): 3.8 CM^2
BH CV ECHO MEAS - LVOT AREA: 3.8 CM^2
BH CV ECHO MEAS - LVOT DIAM: 2.2 CM
BH CV ECHO MEAS - LVPWD: 1.3 CM
BH CV ECHO MEAS - MV A DUR: 0.11 SEC
BH CV ECHO MEAS - MV A MAX VEL: 75.5 CM/SEC
BH CV ECHO MEAS - MV DEC SLOPE: 577 CM/SEC^2
BH CV ECHO MEAS - MV DEC TIME: 156 SEC
BH CV ECHO MEAS - MV E MAX VEL: 94.6 CM/SEC
BH CV ECHO MEAS - MV E/A: 1.3
BH CV ECHO MEAS - MV MEAN PG: 3 MMHG
BH CV ECHO MEAS - MV P1/2T MAX VEL: 125 CM/SEC
BH CV ECHO MEAS - MV P1/2T: 63.5 MSEC
BH CV ECHO MEAS - MV V2 MEAN: 79.7 CM/SEC
BH CV ECHO MEAS - MV V2 VTI: 27.6 CM
BH CV ECHO MEAS - MVA P1/2T LCG: 1.8 CM^2
BH CV ECHO MEAS - MVA(P1/2T): 3.5 CM^2
BH CV ECHO MEAS - MVA(VTI): 3.1 CM^2
BH CV ECHO MEAS - PA ACC SLOPE: 1069 CM/SEC^2
BH CV ECHO MEAS - PA ACC TIME: 0.06 SEC
BH CV ECHO MEAS - PA MAX PG (FULL): 0.69 MMHG
BH CV ECHO MEAS - PA MAX PG: 2.4 MMHG
BH CV ECHO MEAS - PA PR(ACCEL): 50.7 MMHG
BH CV ECHO MEAS - PA V2 MAX: 78.2 CM/SEC
BH CV ECHO MEAS - PULM DIAS VEL: 37.4 CM/SEC
BH CV ECHO MEAS - PULM S/D: 0.69
BH CV ECHO MEAS - PULM SYS VEL: 25.8 CM/SEC
BH CV ECHO MEAS - PVA(V,A): 2.2 CM^2
BH CV ECHO MEAS - PVA(V,D): 2.2 CM^2
BH CV ECHO MEAS - QP/QS: 0.34
BH CV ECHO MEAS - RAP SYSTOLE: 3 MMHG
BH CV ECHO MEAS - RV MAX PG: 1.8 MMHG
BH CV ECHO MEAS - RV MEAN PG: 1 MMHG
BH CV ECHO MEAS - RV V1 MAX: 66.2 CM/SEC
BH CV ECHO MEAS - RV V1 MEAN: 47.9 CM/SEC
BH CV ECHO MEAS - RV V1 VTI: 11.3 CM
BH CV ECHO MEAS - RVOT AREA: 2.5 CM^2
BH CV ECHO MEAS - RVOT DIAM: 1.8 CM
BH CV ECHO MEAS - RVSP: 27 MMHG
BH CV ECHO MEAS - SV(AO): 703.7 ML
BH CV ECHO MEAS - SV(CUBED): 23.2 ML
BH CV ECHO MEAS - SV(LVOT): 84.4 ML
BH CV ECHO MEAS - SV(MOD-SP4): 59 ML
BH CV ECHO MEAS - SV(RVOT): 28.8 ML
BH CV ECHO MEAS - SV(TEICH): 23.9 ML
BH CV ECHO MEAS - TR MAX PG: 24 MMHG
BH CV ECHO MEAS - TR MAX VEL: 243 CM/SEC
BH CV XLRA - RV BASE: 3.9 CM
BH CV XLRA - TDI S': 9.5 CM/SEC
BH CV XLRA MEAS LEFT DIST CCA EDV: 15.2 CM/SEC
BH CV XLRA MEAS LEFT DIST CCA PSV: 66.4 CM/SEC
BH CV XLRA MEAS LEFT DIST ICA EDV: -12.3 CM/SEC
BH CV XLRA MEAS LEFT DIST ICA PSV: -61.6 CM/SEC
BH CV XLRA MEAS LEFT ICA/CCA RATIO: 1.3
BH CV XLRA MEAS LEFT MID ICA EDV: 22.8 CM/SEC
BH CV XLRA MEAS LEFT MID ICA PSV: 88.2 CM/SEC
BH CV XLRA MEAS LEFT PROX CCA EDV: 12.3 CM/SEC
BH CV XLRA MEAS LEFT PROX CCA PSV: 95.8 CM/SEC
BH CV XLRA MEAS LEFT PROX ECA PSV: -82.5 CM/SEC
BH CV XLRA MEAS LEFT PROX ICA EDV: -15.2 CM/SEC
BH CV XLRA MEAS LEFT PROX ICA PSV: -66.4 CM/SEC
BH CV XLRA MEAS LEFT PROX SCLA PSV: 131 CM/SEC
BH CV XLRA MEAS LEFT VERTEBRAL A EDV: 9.5 CM/SEC
BH CV XLRA MEAS LEFT VERTEBRAL A PSV: 45.5 CM/SEC
BH CV XLRA MEAS RIGHT CCA RATIO VEL: 56.4 CM/SEC
BH CV XLRA MEAS RIGHT DIST CCA EDV: 10 CM/SEC
BH CV XLRA MEAS RIGHT DIST CCA PSV: 56.4 CM/SEC
BH CV XLRA MEAS RIGHT DIST ICA EDV: -16.3 CM/SEC
BH CV XLRA MEAS RIGHT DIST ICA PSV: -71.5 CM/SEC
BH CV XLRA MEAS RIGHT ICA RATIO VEL: -94.1 CM/SEC
BH CV XLRA MEAS RIGHT ICA/CCA RATIO: -1.7
BH CV XLRA MEAS RIGHT MID ICA EDV: -19.8 CM/SEC
BH CV XLRA MEAS RIGHT MID ICA PSV: -94.1 CM/SEC
BH CV XLRA MEAS RIGHT PROX CCA EDV: 6.2 CM/SEC
BH CV XLRA MEAS RIGHT PROX CCA PSV: 60.2 CM/SEC
BH CV XLRA MEAS RIGHT PROX ECA PSV: -83.5 CM/SEC
BH CV XLRA MEAS RIGHT PROX ICA EDV: -10.4 CM/SEC
BH CV XLRA MEAS RIGHT PROX ICA PSV: -58.8 CM/SEC
BH CV XLRA MEAS RIGHT PROX SCLA PSV: 64.5 CM/SEC
BH CV XLRA MEAS RIGHT VERTEBRAL A EDV: 12.7 CM/SEC
BH CV XLRA MEAS RIGHT VERTEBRAL A PSV: 64.4 CM/SEC
BILIRUB SERPL-MCNC: 0.6 MG/DL (ref 0–1.2)
BILIRUB UR QL STRIP: NEGATIVE
BUN SERPL-MCNC: 32 MG/DL (ref 8–23)
BUN/CREAT SERPL: 14.7 (ref 7–25)
CALCIUM SPEC-SCNC: 9.4 MG/DL (ref 8.2–9.6)
CHLORIDE SERPL-SCNC: 101 MMOL/L (ref 98–107)
CLARITY UR: CLEAR
CO2 SERPL-SCNC: 24.8 MMOL/L (ref 22–29)
COLOR UR: YELLOW
CREAT SERPL-MCNC: 2.17 MG/DL (ref 0.76–1.27)
DEPRECATED RDW RBC AUTO: 44 FL (ref 37–54)
EOSINOPHIL # BLD AUTO: 0.52 10*3/MM3 (ref 0–0.4)
EOSINOPHIL NFR BLD AUTO: 7.7 % (ref 0.3–6.2)
ERYTHROCYTE [DISTWIDTH] IN BLOOD BY AUTOMATED COUNT: 12.4 % (ref 12.3–15.4)
GFR SERPL CREATININE-BSD FRML MDRD: 28 ML/MIN/1.73
GLOBULIN UR ELPH-MCNC: 3.2 GM/DL
GLUCOSE SERPL-MCNC: 122 MG/DL (ref 65–99)
GLUCOSE UR STRIP-MCNC: NEGATIVE MG/DL
HCT VFR BLD AUTO: 38.8 % (ref 37.5–51)
HGB BLD-MCNC: 12.9 G/DL (ref 13–17.7)
HGB UR QL STRIP.AUTO: NEGATIVE
HYALINE CASTS UR QL AUTO: ABNORMAL /LPF
IMM GRANULOCYTES # BLD AUTO: 0.05 10*3/MM3 (ref 0–0.05)
IMM GRANULOCYTES NFR BLD AUTO: 0.7 % (ref 0–0.5)
KETONES UR QL STRIP: NEGATIVE
LEFT ARM BP: NORMAL MMHG
LEUKOCYTE ESTERASE UR QL STRIP.AUTO: ABNORMAL
LYMPHOCYTES # BLD AUTO: 0.93 10*3/MM3 (ref 0.7–3.1)
LYMPHOCYTES NFR BLD AUTO: 13.7 % (ref 19.6–45.3)
MAXIMAL PREDICTED HEART RATE: 122 BPM
MCH RBC QN AUTO: 31.8 PG (ref 26.6–33)
MCHC RBC AUTO-ENTMCNC: 33.2 G/DL (ref 31.5–35.7)
MCV RBC AUTO: 95.6 FL (ref 79–97)
MONOCYTES # BLD AUTO: 0.7 10*3/MM3 (ref 0.1–0.9)
MONOCYTES NFR BLD AUTO: 10.3 % (ref 5–12)
NEUTROPHILS NFR BLD AUTO: 4.52 10*3/MM3 (ref 1.7–7)
NEUTROPHILS NFR BLD AUTO: 66.9 % (ref 42.7–76)
NITRITE UR QL STRIP: POSITIVE
NRBC BLD AUTO-RTO: 0 /100 WBC (ref 0–0.2)
PH UR STRIP.AUTO: 7 [PH] (ref 5–8)
PLATELET # BLD AUTO: 248 10*3/MM3 (ref 140–450)
PMV BLD AUTO: 10.6 FL (ref 6–12)
POTASSIUM SERPL-SCNC: 3.8 MMOL/L (ref 3.5–5.2)
PROT SERPL-MCNC: 6.9 G/DL (ref 6–8.5)
PROT UR QL STRIP: NEGATIVE
RBC # BLD AUTO: 4.06 10*6/MM3 (ref 4.14–5.8)
RBC # UR: ABNORMAL /HPF
REF LAB TEST METHOD: ABNORMAL
RIGHT ARM BP: NORMAL MMHG
SODIUM SERPL-SCNC: 138 MMOL/L (ref 136–145)
SP GR UR STRIP: 1.01 (ref 1–1.03)
SQUAMOUS #/AREA URNS HPF: ABNORMAL /HPF
STRESS TARGET HR: 104 BPM
TROPONIN T SERPL-MCNC: 0.03 NG/ML (ref 0–0.03)
UROBILINOGEN UR QL STRIP: ABNORMAL
WBC # BLD AUTO: 6.77 10*3/MM3 (ref 3.4–10.8)
WBC UR QL AUTO: ABNORMAL /HPF

## 2020-01-01 PROCEDURE — 93306 TTE W/DOPPLER COMPLETE: CPT | Performed by: INTERNAL MEDICINE

## 2020-01-01 PROCEDURE — 99284 EMERGENCY DEPT VISIT MOD MDM: CPT

## 2020-01-01 PROCEDURE — 81001 URINALYSIS AUTO W/SCOPE: CPT | Performed by: EMERGENCY MEDICINE

## 2020-01-01 PROCEDURE — 93294 REM INTERROG EVL PM/LDLS PM: CPT | Performed by: INTERNAL MEDICINE

## 2020-01-01 PROCEDURE — 93296 REM INTERROG EVL PM/IDS: CPT | Performed by: INTERNAL MEDICINE

## 2020-01-01 PROCEDURE — 70450 CT HEAD/BRAIN W/O DYE: CPT

## 2020-01-01 PROCEDURE — 85025 COMPLETE CBC W/AUTO DIFF WBC: CPT | Performed by: EMERGENCY MEDICINE

## 2020-01-01 PROCEDURE — 93880 EXTRACRANIAL BILAT STUDY: CPT

## 2020-01-01 PROCEDURE — 93306 TTE W/DOPPLER COMPLETE: CPT

## 2020-01-01 PROCEDURE — 99214 OFFICE O/P EST MOD 30 MIN: CPT | Performed by: INTERNAL MEDICINE

## 2020-01-01 PROCEDURE — 80053 COMPREHEN METABOLIC PANEL: CPT | Performed by: EMERGENCY MEDICINE

## 2020-01-01 PROCEDURE — 93005 ELECTROCARDIOGRAM TRACING: CPT | Performed by: EMERGENCY MEDICINE

## 2020-01-01 PROCEDURE — 93010 ELECTROCARDIOGRAM REPORT: CPT | Performed by: INTERNAL MEDICINE

## 2020-01-01 PROCEDURE — 93288 INTERROG EVL PM/LDLS PM IP: CPT | Performed by: INTERNAL MEDICINE

## 2020-01-01 PROCEDURE — 84484 ASSAY OF TROPONIN QUANT: CPT | Performed by: EMERGENCY MEDICINE

## 2020-01-01 RX ORDER — SODIUM CHLORIDE 0.9 % (FLUSH) 0.9 %
10 SYRINGE (ML) INJECTION AS NEEDED
Status: DISCONTINUED | OUTPATIENT
Start: 2020-01-01 | End: 2020-01-01 | Stop reason: HOSPADM

## 2020-01-01 RX ORDER — FUROSEMIDE 40 MG/1
40 TABLET ORAL 2 TIMES DAILY
Qty: 180 TABLET | Refills: 1 | Status: SHIPPED | OUTPATIENT
Start: 2020-01-01 | End: 2020-01-01

## 2020-01-01 RX ORDER — FUROSEMIDE 40 MG/1
40 TABLET ORAL 2 TIMES DAILY
Qty: 180 TABLET | Refills: 1 | Status: SHIPPED | OUTPATIENT
Start: 2020-01-01

## 2020-01-01 RX ORDER — ATORVASTATIN CALCIUM 20 MG/1
TABLET, FILM COATED ORAL
Qty: 90 TABLET | Refills: 1 | Status: SHIPPED | OUTPATIENT
Start: 2020-01-01

## 2020-01-01 RX ORDER — ATORVASTATIN CALCIUM 20 MG/1
20 TABLET, FILM COATED ORAL NIGHTLY
Qty: 90 TABLET | Refills: 1 | Status: SHIPPED | OUTPATIENT
Start: 2020-01-01 | End: 2020-01-01

## 2020-01-01 RX ORDER — FUROSEMIDE 40 MG/1
TABLET ORAL
Qty: 180 TABLET | Refills: 0 | Status: SHIPPED | OUTPATIENT
Start: 2020-01-01 | End: 2020-01-01 | Stop reason: SDUPTHER

## 2020-01-15 NOTE — TELEPHONE ENCOUNTER
"PT'S DAUGHTER CALLED NEEDING REFILLS \"atorvastatin (LIPITOR) 20 MG tablet\" , \"calcitriol (ROCALTROL) 0.25 MCG capsule\", \"furosemide (LASIX) 40 MG tablet BID\" (ALL q 90 w/ refills) SENT TO [facility]    CB# [phone]   "

## 2020-01-15 NOTE — TELEPHONE ENCOUNTER
Rx's sent in for Atorvastatin and Furosemide, but we do not fill the Calcitriol, daughter informed.

## 2020-02-18 NOTE — TELEPHONE ENCOUNTER
Spoke with Dr. Luna, she suggests that the patient go to the ER, this could be a number of things & may need testing to diagnose. As for his Medtronic device, we checked it at his appointment in November. After speaking with Emmy, she states they aren't able to transmit a remote reading & I advised her to call the company for support. Will have Willow check to see if we received a transmission tomorrow.

## 2020-02-18 NOTE — TELEPHONE ENCOUNTER
IN DAUGHTER CALLED, BUFFY, SHE SAYS PT HAS LEFT ARM PAIN AND NUMBNESS IN FINGERS. LAST TRANSMISSION FOR HIS MEDTRONIC WAS 6/3/19, SCANNED IN CHART.

## 2020-02-19 NOTE — TELEPHONE ENCOUNTER
I looked in [employer] and the patient is enrolled with us in our home device clinic but have not received a transmission from him since 2018. Please let the patient know that he needs to call the 1800 number and have them trouble shoot his transmission box. Thanks!

## 2020-02-28 NOTE — TELEPHONE ENCOUNTER
Karine with [facility] is needing to get refills for patient    Metoprolol 25 mg 1/2 tab 2 x daily  90 day supply    Karine [phone]  Ref #[id number]

## 2020-07-23 NOTE — PROGRESS NOTES
"  Subjective:     Encounter Date:07/23/2020      Patient ID: Robert Cheek is a 89 y.o. male.    Chief Complaint:  Chief Complaint   Patient presents with   • Follow-up       HPI:  History of Present Illness     I saw Robert in the office today.  He is accompanied by his daughter.  He is a 89-year-old gentleman with a history of valvular aortic stenosis, atrial fibrillation, permanent pacemaker, essential hypertension, dyslipidemia, orthostatic hypotension, chronic kidney disease, normal pressure hydrocephalus with a shunt in place.    Robert presents to the office today with complaints of \"suddenly nodding off\" for a few seconds.  This occurs after activity or if he has been out doing things during the day.  He states he will sit down and suddenly doze off for a few seconds.  He does not feel any palpitations during this time.  He is not complaining of shortness of air, orthopnea or paroxysmal nocturnal dyspnea.  He denies chest discomfort.  He has seen neurosurgery and there is been no change in his shunt.  Interrogation of his pacemaker reveals appropriate function.  He has had some episodes of atrial flutter.  He has 5 years remaining on his battery.  Robert is not on anticoagulation secondary to numerous falls.  He has family are aware of possibility of TIAs and strokes    The following portions of the patient's history were reviewed and updated as appropriate: allergies, current medications, past family history, past medical history, past social history, past surgical history and problem list.    Problem List:  Patient Active Problem List   Diagnosis   • Acute pain of right shoulder   • Aortic root dilatation (CMS/HCC)   • Aortic valve stenosis   • Arthritis   • Benign essential tremor   • CAD (coronary artery disease)   • Chronic kidney disease   • Congestive heart failure (CMS/HCC)   • Gastritis   • GERD (gastroesophageal reflux disease)   • Tachycardia   • Sick sinus syndrome (CMS/HCC)   • Permanent atrial " fibrillation (CMS/[facility])   • NPH (normal pressure hydrocephalus) (CMS/[facility])   • Hypertension   • Hyperlipidemia   • Hypercholesteremia   • Hydrocephalus (CMS/HCC)   • Edema   • Presence of cardiac pacemaker   • Autonomic orthostatic hypotension   • Lymphatic edema       Past Medical History:  Past Medical History:   Diagnosis Date   • Aortic root dilatation (CMS/HCC)    • Aortic valve stenosis    • Atrial fibrillation (CMS/HCC)    • CHF (congestive heart failure) (CMS/[facility])    • Chronic kidney disease    • GERD (gastroesophageal reflux disease)    • Hydrocephalus (CMS/HCC)    • Hyperlipidemia    • Hypertension    • SSS (sick sinus syndrome) (CMS/[facility])        Past Surgical History:  Past Surgical History:   Procedure Laterality Date   • CSF SHUNT  05/2017   • ECTROPION REPAIR Bilateral 10/17/2019    Procedure: BILATERAL LOWER LID ECTROPION REPAIR;  Surgeon: Iker Wise MD;  [place] [facility] [place] [facility] [place]: [place]mology   • HERNIA REPAIR     • INSERT / REPLACE / REMOVE PACEMAKER     • KNEE SURGERY Left 2012   • PACEMAKER REPLACEMENT  2006       Social History:  Social History     Socioeconomic History   • Marital status:      Spouse name: Not on file   • Number of children: Not on file   • Years of education: Not on file   • Highest education level: Not on file   Tobacco Use   • Smoking status: Never Smoker   Substance and Sexual Activity   • Alcohol use: No     Frequency: Never   • Drug use: No       Allergies:  Allergies   Allergen Reactions   • Adhesive Tape Unknown (See Comments)     Redness, raw   • Dabigatran Etexilate Mesylate Nausea And Vomiting   • Ibuprofen GI Intolerance   • Indomethacin Nausea And Vomiting and Unknown (See Comments)   • Simvastatin Nausea And Vomiting and Unknown (See Comments)       Immunizations:  Immunization History   Administered Date(s) Administered   • Fluzone High Dose =>65 Years ([employer] ONLY) 09/06/2014   • Hepatitis A 07/06/2018   • Influenza LAIV (Nasal)  "08/20/2012   • Pneumococcal Conjugate 13-Valent (PCV13) 10/07/2015       ROS:  Review of Systems   Constitution: Positive for malaise/fatigue. Negative for chills, decreased appetite, fever, weight gain and weight loss.   HENT: Negative for congestion, hoarse voice, nosebleeds and sore throat.    Eyes: Negative for blurred vision, double vision and visual disturbance.   Cardiovascular: Negative for chest pain, claudication, dyspnea on exertion, irregular heartbeat, leg swelling, near-syncope, orthopnea, palpitations, paroxysmal nocturnal dyspnea and syncope.   Respiratory: Negative for cough, hemoptysis, shortness of breath, sleep disturbances due to breathing, snoring, sputum production and wheezing.    Endocrine: Negative for cold intolerance, heat intolerance, polydipsia and polyuria.   Hematologic/Lymphatic: Negative for adenopathy and bleeding problem. Does not bruise/bleed easily.   Skin: Negative for flushing, itching, nail changes and rash.   Musculoskeletal: Positive for arthritis. Negative for back pain, joint pain, muscle cramps, muscle weakness, myalgias and neck pain.   Gastrointestinal: Negative for bloating, abdominal pain, anorexia, change in bowel habit, constipation, diarrhea, heartburn, hematemesis, hematochezia, jaundice, melena, nausea and vomiting.   Genitourinary: Negative for dysuria, hematuria and nocturia.   Neurological: Negative for brief paralysis, disturbances in coordination, excessive daytime sleepiness, dizziness, headaches, light-headedness, loss of balance, numbness, paresthesias, seizures and vertigo.        Sudden \"nodding off\"   Psychiatric/Behavioral: Negative for altered mental status and depression. The patient is not nervous/anxious.    Allergic/Immunologic: Negative for environmental allergies and hives.          Objective:         /70 (BP Location: Right arm, Patient Position: Sitting)   Pulse 73   Resp 18   Ht 177.8 cm (70\")   Wt 81.4 kg (179 lb 6.4 oz)   BMI " 25.74 kg/m²     Physical Exam   Constitutional: He is oriented to person, place, and time. He appears well-developed and well-nourished. No distress.   Elderly male, slightly disheveled.  No acute distress.  Alert and conversational   HENT:   Head: Macrocephalic.   Shunt is noted in the right parietal area   Eyes: Pupils are equal, round, and reactive to light. Conjunctivae and EOM are normal. No scleral icterus.   Neck: Normal range of motion. Neck supple. No thyromegaly present.   Cardiovascular: Normal rate, regular rhythm, S1 normal, S2 normal and intact distal pulses.  No extrasystoles are present. PMI is not displaced. Exam reveals no gallop, no S3, no S4, no friction rub and no decreased pulses.   Murmur ( There is a  3/6 systolic murmur heard at the upper right sternal border  ) heard.  Pulses:       Carotid pulses are 2+ on the right side, and 2+ on the left side.       Dorsalis pedis pulses are 1+ on the right side, and 1+ on the left side.        Posterior tibial pulses are 1+ on the right side, and 1+ on the left side.   Pulmonary/Chest: Effort normal and breath sounds normal. No respiratory distress. He has no wheezes. He has no rales.   Abdominal: Soft. Bowel sounds are normal. He exhibits no distension and no mass. There is no tenderness. There is no rebound and no guarding.   Musculoskeletal: He exhibits edema (Wearing compression stockings).   Slow gait   Lymphadenopathy:     He has no cervical adenopathy.   Neurological: He is alert and oriented to person, place, and time. Coordination normal.   Skin: Skin is warm and dry. No rash noted. He is not diaphoretic. No pallor.   Skin is very thin and has multiple bruises and occasional tears are noted   Psychiatric: He has a normal mood and affect. His behavior is normal.   Nursing note and vitals reviewed.      In-Office Procedure(s):  Procedures    ASCVD RIsk Score::  The ASCVD Risk score (Brickmariel HAJI Jr., et al., 2013) failed to calculate for the  "following reasons:    The 2013 ASCVD risk score is only valid for ages 40 to 79    Recent Radiology:  Imaging Results (Most Recent)     None          Lab Review:   not applicable             Assessment:          Diagnosis Plan   1. Nonrheumatic aortic valve stenosis  Adult Transthoracic Echo Complete W/ Cont if Necessary Per Protocol   2. Permanent atrial fibrillation (CMS/HCC)     3. Paroxysmal atrial flutter (CMS/HCC)     4. Essential hypertension     5. Presence of cardiac pacemaker     6. Autonomic orthostatic hypotension            Plan:   It is not clear what the etiology of his sudden \"nodding off\" is.  I could postulate that he may be having TIAs since he is not on anticoagulation and has paroxysmal atrial fibrillation/flutter.  The episodes however are very brief and consist of seconds and duration.  His pacemaker is functioning normally.  I will recheck his echocardiogram as I am monitoring him for progression of aortic stenosis but I do not think that this is the etiology of his brief episodes as well.  Symptoms may well be associated with aging.      Level of Care:                 Marquita Luna MD  07/23/20  .  "

## 2020-09-21 NOTE — ED NOTES
Pt presents to ED via EMS from home. Pt was getting out of the shower, and became weak and was lowered down to the seat. Pt was weak, confused, and mumbling with headache this morning. Upon EMS arrival pt is A&OX4, able to ambulate with EMS assist, and in a mask. Pt denies CP or SOA. EMS EKG done in route. Pt denies use of blood thinners.      Timothy Bustamante, RN  09/21/20 [id number]

## 2020-09-21 NOTE — TELEPHONE ENCOUNTER
Lvm re: heart rate, had Lucita A look at the transmission and he is not currently at 158, but he has been and she needs to take him to the ER if this happens, he will need IV beta blockers to bring this down. Advised to c/b with any questions. dmk

## 2020-09-21 NOTE — ED PROVIDER NOTES
EMERGENCY DEPARTMENT ENCOUNTER    Room Number:  24/24  Date of encounter:  9/21/2020  PCP: Pallares, Clara Ann, MD  Historian: Patient     I used full protective equipment while examining this patient.  This includes face mask, gloves and protective eyewear.  I washed my hands before entering the room and immediately upon leaving the room.  Patient was wearing a surgical mask.      HPI:  Chief Complaint: Generalized weakness  A complete HPI/ROS/PMH/PSH/SH/FH are unobtainable due to: None    Context: Robert Cheek is a 89 y.o. male who presents to the ED c/o generalized weakness that began several hours ago when he was taking a shower.  Patient states he had to be lowered to the ground.  He denies hitting his head or sustaining any injury.  He also reports having a mild headache this morning which is since resolved.  He denies fever, chills, chest pain, shortness of breath, nausea, vomiting, abdominal pain, numbness, tingling, recent illness, or known exposure to anyone diagnosed with COVID-19.  Patient has no complaints at present.  Patient states he was started on a new blood pressure medication recently and thinks that this is contributing to his symptoms.  However, he is unsure of the name of this medication.      PAST MEDICAL HISTORY  Active Ambulatory Problems     Diagnosis Date Noted   • Acute pain of right shoulder 11/02/2016   • Aortic root dilatation (CMS/[facility]) 08/06/2018   • Aortic valve stenosis 08/06/2018   • Arthritis 08/06/2018   • Benign essential tremor 08/06/2018   • CAD (coronary artery disease) 08/06/2018   • Chronic kidney disease 08/06/2018   • Congestive heart failure (CMS/[facility]) 08/06/2018   • Gastritis 08/06/2018   • GERD (gastroesophageal reflux disease) 08/06/2018   • Tachycardia 11/02/2016   • Sick sinus syndrome (CMS/[facility]) 08/06/2018   • Permanent atrial fibrillation (CMS/[facility]) 08/06/2018   • NPH (normal pressure hydrocephalus) (CMS/[facility]) 03/29/2017   • Hypertension 08/06/2018   •  Hyperlipidemia 08/06/2018   • Hypercholesteremia 04/13/2016   • Hydrocephalus (CMS/HCC) 08/06/2018   • Edema 08/06/2018   • Presence of cardiac pacemaker 06/05/2019   • Autonomic orthostatic hypotension 06/05/2019   • Lymphatic edema 07/23/2019     Resolved Ambulatory Problems     Diagnosis Date Noted   • No Resolved Ambulatory Problems     Past Medical History:   Diagnosis Date   • Atrial fibrillation (CMS/HCC)    • CHF (congestive heart failure) (CMS/HCC)    • SSS (sick sinus syndrome) (CMS/formerly [facility])          PAST SURGICAL HISTORY  Past Surgical History:   Procedure Laterality Date   • CSF SHUNT  05/2017   • ECTROPION REPAIR Bilateral 10/17/2019    Procedure: BILATERAL LOWER LID ECTROPION REPAIR;  Surgeon: Iker Wise MD;  Location: [facility] OR [facility] – [place], [place];  Service: Ophthalmology   • HERNIA REPAIR     • INSERT / REPLACE / REMOVE PACEMAKER     • KNEE SURGERY Left 2012   • PACEMAKER REPLACEMENT  2006         FAMILY HISTORY  Family History   Problem Relation Age of Onset   • Malig Hyperthermia Neg Hx          SOCIAL HISTORY  Social History     Socioeconomic History   • Marital status:      Spouse name: Not on file   • Number of children: Not on file   • Years of education: Not on file   • Highest education level: Not on file   Tobacco Use   • Smoking status: Never Smoker   Substance and Sexual Activity   • Alcohol use: No     Frequency: Never   • Drug use: No         ALLERGIES  Adhesive tape, Dabigatran etexilate mesylate, Ibuprofen, Indomethacin, and Simvastatin       REVIEW OF SYSTEMS  Review of Systems      All systems have been reviewed and are negative except as as discussed in the HPI    PHYSICAL EXAM    I have reviewed the triage vital signs and nursing notes.    ED Triage Vitals [09/21/20 1433]   Temp Heart Rate Resp BP SpO2   97.5 °F (36.4 °C) 84 18 118/60 96 %      Temp src Heart Rate Source Patient Position BP Location FiO2 (%)   Oral Monitor Sitting Right arm --       Physical Exam  GENERAL: Awake,  alert, no acute distress  HENT: NCAT, nares patent, moist mucous membranes  NECK: supple, no lymphadenopathy  EYES: no scleral icterus, extra muscles intact, no nystagmus  CV: Irregularly irregular rhythm, regular rate, no murmur  RESPIRATORY: normal effort, clear to auscultation bilaterally  ABDOMEN: soft, nontender, nondistended  MUSCULOSKELETAL: Extremities are nontender and without obvious deformity.  There is normal range of motion in all extremities.  There is no calf tenderness or pedal edema  NEURO:  No facial droop.  Tongue protrudes midline.  Speech is clear and fluent.  No aphasia.  Normal strength and light touch sensation bilateral face and all extremities.  Normal finger-to-nose and heel-to-shin testing bilaterally  SKIN: warm, dry, no rash  PSYCH: Normal mood and affect      LAB RESULTS  Recent Results (from the past 24 hour(s))   Comprehensive Metabolic Panel    Collection Time: 09/21/20  3:16 PM    Specimen: Blood   Result Value Ref Range    Glucose 122 (H) 65 - 99 mg/dL    BUN 32 (H) 8 - 23 mg/dL    Creatinine 2.17 (H) 0.76 - 1.27 mg/dL    Sodium 138 136 - 145 mmol/L    Potassium 3.8 3.5 - 5.2 mmol/L    Chloride 101 98 - 107 mmol/L    CO2 24.8 22.0 - 29.0 mmol/L    Calcium 9.4 8.2 - 9.6 mg/dL    Total Protein 6.9 6.0 - 8.5 g/dL    Albumin 3.70 3.50 - 5.20 g/dL    ALT (SGPT) 18 1 - 41 U/L    AST (SGOT) 18 1 - 40 U/L    Alkaline Phosphatase 146 (H) 39 - 117 U/L    Total Bilirubin 0.6 0.0 - 1.2 mg/dL    eGFR Non African Amer 28 (L) >60 mL/min/1.73    Globulin 3.2 gm/dL    A/G Ratio 1.2 g/dL    BUN/Creatinine Ratio 14.7 7.0 - 25.0    Anion Gap 12.2 5.0 - 15.0 mmol/L   Troponin    Collection Time: 09/21/20  3:16 PM    Specimen: Blood   Result Value Ref Range    Troponin T 0.026 0.000 - 0.030 ng/mL   CBC Auto Differential    Collection Time: 09/21/20  3:16 PM    Specimen: Blood   Result Value Ref Range    WBC 6.77 3.40 - 10.80 10*3/mm3    RBC 4.06 (L) 4.14 - 5.80 10*6/mm3    Hemoglobin 12.9 (L) 13.0 -  17.7 g/dL    Hematocrit 38.8 37.5 - 51.0 %    MCV 95.6 79.0 - 97.0 fL    MCH 31.8 26.6 - 33.0 pg    MCHC 33.2 31.5 - 35.7 g/dL    RDW 12.4 12.3 - 15.4 %    RDW-SD 44.0 37.0 - 54.0 fl    MPV 10.6 6.0 - 12.0 fL    Platelets 248 140 - 450 10*3/mm3    Neutrophil % 66.9 42.7 - 76.0 %    Lymphocyte % 13.7 (L) 19.6 - 45.3 %    Monocyte % 10.3 5.0 - 12.0 %    Eosinophil % 7.7 (H) 0.3 - 6.2 %    Basophil % 0.7 0.0 - 1.5 %    Immature Grans % 0.7 (H) 0.0 - 0.5 %    Neutrophils, Absolute 4.52 1.70 - 7.00 10*3/mm3    Lymphocytes, Absolute 0.93 0.70 - 3.10 10*3/mm3    Monocytes, Absolute 0.70 0.10 - 0.90 10*3/mm3    Eosinophils, Absolute 0.52 (H) 0.00 - 0.40 10*3/mm3    Basophils, Absolute 0.05 0.00 - 0.20 10*3/mm3    Immature Grans, Absolute 0.05 0.00 - 0.05 10*3/mm3    nRBC 0.0 0.0 - 0.2 /100 WBC   Urinalysis With Microscopic If Indicated (No Culture) - Urine, Clean Catch    Collection Time: 09/21/20  4:14 PM    Specimen: Urine, Clean Catch   Result Value Ref Range    Color, UA Yellow Yellow, Straw    Appearance, UA Clear Clear    pH, UA 7.0 5.0 - 8.0    Specific Gravity, UA 1.008 1.005 - 1.030    Glucose, UA Negative Negative    Ketones, UA Negative Negative    Bilirubin, UA Negative Negative    Blood, UA Negative Negative    Protein, UA Negative Negative    Leuk Esterase, UA Trace (A) Negative    Nitrite, UA Positive (A) Negative    Urobilinogen, UA 0.2 E.U./dL 0.2 - 1.0 E.U./dL   Urinalysis, Microscopic Only - Urine, Clean Catch    Collection Time: 09/21/20  4:14 PM    Specimen: Urine, Clean Catch   Result Value Ref Range    RBC, UA 0-2 None Seen, 0-2 /HPF    WBC, UA 3-5 (A) None Seen, 0-2 /HPF    Bacteria, UA None Seen None Seen /HPF    Squamous Epithelial Cells, UA 0-2 None Seen, 0-2 /HPF    Hyaline Casts, UA 0-2 None Seen /LPF    Methodology Automated Microscopy        Ordered the above labs and independently reviewed the results.      RADIOLOGY  Ct Head Without Contrast    Result Date: 9/21/2020  CT HEAD WITHOUT  CONTRAST  HISTORY: Headache, dizzy.  COMPARISON: MRI brain 06/05/2003.  FINDINGS: A ventricular peritoneal shunt catheter is noted entering from the right frontal region with the tip of the catheter in the right lateral ventricle.  There is a moderate enlargement of the lateral and third ventricles. Confluent areas of decreased attenuation are noted involving the white matter of the cerebral hemispheres bilaterally on the lateral and third ventricles are slightly more prominent as compared to 06/05/2003.  No focal area of decreased attenuation to suggest acute infarction is identified. A remote infarct involving the left cerebral hemisphere posteriorly is appreciated. This is new versus the prior MRI examination 2003.  There is a low-attenuation subdural fluid collection overlying the left frontal and parietal lobe superolaterally measuring 3 mm in maximum thickness. It is slightly increased in attenuation relative to CSF.      1. No evidence of acute infarction or hemorrhage. Moderate prominence of the lateral and third ventricles. The ventricles were mildly prominent on 06/05/2003. The lateral and third ventricles are slightly larger in size as compared to the prior examination. 2. A remote infarct involving the left cerebral hemisphere is noted posteriorly. This is new versus prior examination. The findings are suggestive of a subacute to remote subdural. It is age indeterminate. A follow-up CT examination of the head is recommended.  The above information was called to and discussed with Dr. Soriano.  Radiation dose reduction techniques were utilized, including automated exposure control and exposure modulation based on body size.        I ordered the above noted radiological studies. Reviewed by me and discussed with radiologist.  See dictation for official radiology interpretation.      PROCEDURES  Procedures      MEDICATIONS GIVEN IN ER    Medications   sodium chloride 0.9 % flush 10 mL (has no administration  "in time range)         PROGRESS, DATA ANALYSIS, CONSULTS, AND MEDICAL DECISION MAKING    All labs have been independently reviewed by me.  All radiology studies have been reviewed by me and discussed with radiologist dictating the report.   EKG's independently viewed and interpreted by me.  I have reviewed the nurse's notes, vital signs, past medical history, and medication list.  Discussion below represents my analysis of pertinent findings related to patient's condition, differential diagnosis, treatment plan and final disposition.      ED Course as of Sep 21 1835   Mon Sep 21, 2020   1441 Old records reviewed.  Patient was admitted here in June 2014 for A. fib with RVR.  Echocardiogram done last month showed mild concentric LVH.  EF was greater than 70%.  There was moderate aortic regurgitation and aortic stenosis    [WH]   1502 Additional history obtained from the patient's son, who is now present in the ED.  He states the patient took a dose of Topamax at around 7 AM this morning.  He was started on this  approximately 2 weeks ago.  Son reports that his sister went to check on the patient at around 2 PM.  She found the patient  \"slumped forward and drooling\".  His speech was incoherent.  Patient's son got there a few minutes later and the patient was \"back to normal\".    [WH]   1546 EKG          EKG time: 1520  Rhythm/Rate: Sinus tachycardia, rate 103  P waves and WV: Normal  QRS, axis: LAD, LVH, right bundle branch block, anterior Q waves  ST and T waves: Nonspecific T wave changes anteriorly    Interpreted Contemporaneously by me, independently viewed  EKG is changed compared to prior EKG done 10/8/2019.  There are no new ischemic changes      [WH]   1555 Results of the head CT discussed with Dr. Flores (radiologist).  Images independently viewed by me.  No acute findings.  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  There is mild prominence of the lateral and third ventricles.  There is a probable hygroma in " the left frontal lobe.  Follow-up head CT is recommended.    [WH]   1558 1.92 eleven months ago   Creatinine(!): 2.17 [WH]   1740 Per [employer], common reactions to Topamax include somnolence, dizziness, and fatigue    [WH]   1802 Test results discussed with the patient and his son.  Patient is resting comfortably and has no complaints.  He has been able to stand and ambulate without difficulty.  I discussed with them that his work-up was unremarkable but it is unclear exactly what caused his episode of decreased responsiveness earlier today.  Patient was offered admission for further evaluation.  Patient declined this and wants to be discharged.  Return precautions were discussed.    [WH]      ED Course User Index  [] Inocencio Soriano MD       AS OF 18:35 EDT VITALS:    BP - 118/65  HR - 67  TEMP - 97.5 °F (36.4 °C) (Oral)  O2 SATS - 98%      DIAGNOSIS  Final diagnoses:   Generalized weakness  Chronic renal insufficiency  Possible adverse reaction to medication         DISPOSITION  Discharge    DISCHARGE    Patient discharged in stable condition.    Reviewed implications of results, diagnosis, meds, responsibility to follow up, warning signs and symptoms of possible worsening, potential complications and reasons to return to ER, including worsening symptoms,.    Patient/Family voiced understanding of above instructions.    Discussed plan for discharge, as there is no emergent indication for admission. Patient referred to primary care provider for BP management due to today's BP. Pt/family is agreeable and understands need for follow up and repeat testing.  Pt is aware that discharge does not mean that nothing is wrong but it indicates no emergency is present that requires admission and they must continue care with follow-up as given below or physician of their choice.     FOLLOW-UP  Pallares, Clara Ann, MD  [address] [facility] [address] [phone]    Call in 2 days  If symptoms persist          Medication List      No changes were made to your prescriptions during this visit.           Dictated utilizing Dragon dictation:  Much of this encounter note is an electronic transcription/translation of spoken language to printed text. The electronic translation of spoken language may permit erroneous, or at times, nonsensical words or phrases to be inadvertently transcribed; Although I have reviewed the note for such errors, some may still exist.     Inocencio Soriano MD  09/21/20 1830

## 2020-09-21 NOTE — DISCHARGE INSTRUCTIONS
Follow-up with your primary care doctor in the next several days.  Return to the emergency department for worsening or recurrent symptoms, fever, chest pain, shortness of breath, trouble walking, fainting, or other concern.

## 2020-09-21 NOTE — TELEPHONE ENCOUNTER
CPA / AGD  Pt daughter called stating they transmitted a MDT report because pt's hrt rate was 158     #[phone]

## 2020-09-25 NOTE — TELEPHONE ENCOUNTER
Patient was prescribed Rizatriptan, would like to know if this is safe to take with his heart condition?

## 2020-09-29 NOTE — TELEPHONE ENCOUNTER
I read the note from DR hardin to Sandra, she will start PT on the medication and let us know how he is doing.

## 2020-09-29 NOTE — TELEPHONE ENCOUNTER
It can cause drowsiness, can occasionally increase the heart rate and blood pressure.  He can try a couple of doses but watch his blood pressure and his heart rate.  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  We will need to interrogate after he has had a couple of doses to see how it is affecting his heart rate

## (undated) DEVICE — GLV SURG BIOGEL SENSR LTX PF SZ7.5

## (undated) DEVICE — ELECTRD NDL EZ CLN MOD 2.75IN

## (undated) DEVICE — SUT VIC 5/0 P3 18IN J493G

## (undated) DEVICE — PK ENT 40

## (undated) DEVICE — PENCL EVAC ULTRAVAC SMOKE W/BLD

## (undated) DEVICE — SUT GUT PLN FAST ABS 5/0 PC1 18IN 1915G

## (undated) DEVICE — STERILE COTTON TIP 6IN 10PK: Brand: MEDLINE

## (undated) DEVICE — WIPE INST MEROCEL

## (undated) DEVICE — NDL HYPO PRECISIONGLIDE REG 25G 1 1/2

## (undated) DEVICE — IMMOB HD UNIV CLR DISP

## (undated) DEVICE — SWABSTK SKINPREP PVPI LF PK/3

## (undated) DEVICE — CROUCH CORNEAL PROTECTOR: Brand: BAUSCH + LOMB